# Patient Record
Sex: FEMALE | Race: OTHER | NOT HISPANIC OR LATINO
[De-identification: names, ages, dates, MRNs, and addresses within clinical notes are randomized per-mention and may not be internally consistent; named-entity substitution may affect disease eponyms.]

---

## 2018-08-13 PROBLEM — Z00.00 ENCOUNTER FOR PREVENTIVE HEALTH EXAMINATION: Status: ACTIVE | Noted: 2018-08-13

## 2018-08-17 ENCOUNTER — APPOINTMENT (OUTPATIENT)
Dept: SURGERY | Facility: CLINIC | Age: 38
End: 2018-08-17
Payer: MEDICAID

## 2018-08-17 VITALS
WEIGHT: 203.25 LBS | SYSTOLIC BLOOD PRESSURE: 92 MMHG | HEIGHT: 64.96 IN | OXYGEN SATURATION: 99 % | TEMPERATURE: 97.7 F | BODY MASS INDEX: 33.86 KG/M2 | DIASTOLIC BLOOD PRESSURE: 60 MMHG | HEART RATE: 90 BPM

## 2018-08-17 DIAGNOSIS — Z87.39 PERSONAL HISTORY OF OTHER DISEASES OF THE MUSCULOSKELETAL SYSTEM AND CONNECTIVE TISSUE: ICD-10-CM

## 2018-08-17 PROCEDURE — 99204 OFFICE O/P NEW MOD 45 MIN: CPT

## 2018-08-17 RX ORDER — CLONAZEPAM 2 MG/1
TABLET ORAL
Refills: 0 | Status: ACTIVE | COMMUNITY

## 2018-08-17 RX ORDER — ERGOCALCIFEROL (VITAMIN D2) 1250 MCG
50000 CAPSULE ORAL
Refills: 0 | Status: ACTIVE | COMMUNITY

## 2018-08-17 RX ORDER — IRON/IRON ASP GLY/FA/MV-MIN 38 125-25-1MG
TABLET ORAL
Refills: 0 | Status: ACTIVE | COMMUNITY

## 2018-08-21 ENCOUNTER — APPOINTMENT (OUTPATIENT)
Dept: SURGERY | Facility: CLINIC | Age: 38
End: 2018-08-21

## 2018-08-21 VITALS — WEIGHT: 208.5 LBS | BODY MASS INDEX: 34.74 KG/M2

## 2018-09-10 ENCOUNTER — APPOINTMENT (OUTPATIENT)
Dept: SURGERY | Facility: CLINIC | Age: 38
End: 2018-09-10
Payer: MEDICAID

## 2018-09-10 ENCOUNTER — APPOINTMENT (OUTPATIENT)
Dept: PULMONOLOGY | Facility: CLINIC | Age: 38
End: 2018-09-10
Payer: MEDICAID

## 2018-09-10 VITALS
OXYGEN SATURATION: 98 % | HEART RATE: 79 BPM | WEIGHT: 205 LBS | DIASTOLIC BLOOD PRESSURE: 60 MMHG | BODY MASS INDEX: 35 KG/M2 | RESPIRATION RATE: 12 BRPM | HEIGHT: 64 IN | TEMPERATURE: 98.1 F | SYSTOLIC BLOOD PRESSURE: 90 MMHG

## 2018-09-10 DIAGNOSIS — Z01.811 ENCOUNTER FOR PREPROCEDURAL RESPIRATORY EXAMINATION: ICD-10-CM

## 2018-09-10 DIAGNOSIS — Z78.9 OTHER SPECIFIED HEALTH STATUS: ICD-10-CM

## 2018-09-10 PROCEDURE — 97802 MEDICAL NUTRITION INDIV IN: CPT

## 2018-09-10 PROCEDURE — 99203 OFFICE O/P NEW LOW 30 MIN: CPT

## 2018-09-10 RX ORDER — ALBUTEROL SULFATE 2.5 MG/3ML
(2.5 MG/3ML) SOLUTION RESPIRATORY (INHALATION)
Qty: 2 | Refills: 11 | Status: ACTIVE | COMMUNITY
Start: 2018-09-10

## 2018-09-21 ENCOUNTER — APPOINTMENT (OUTPATIENT)
Dept: PULMONOLOGY | Facility: CLINIC | Age: 38
End: 2018-09-21

## 2018-10-05 ENCOUNTER — APPOINTMENT (OUTPATIENT)
Dept: SLEEP CENTER | Facility: HOME HEALTH | Age: 38
End: 2018-10-05

## 2018-10-05 VITALS — HEIGHT: 64 IN | WEIGHT: 207.13 LBS | BODY MASS INDEX: 35.36 KG/M2

## 2018-10-11 ENCOUNTER — OTHER (OUTPATIENT)
Age: 38
End: 2018-10-11

## 2018-11-09 ENCOUNTER — OUTPATIENT (OUTPATIENT)
Dept: OUTPATIENT SERVICES | Facility: HOSPITAL | Age: 38
LOS: 1 days | End: 2018-11-09
Payer: MEDICAID

## 2018-11-09 ENCOUNTER — APPOINTMENT (OUTPATIENT)
Dept: RADIOLOGY | Facility: HOSPITAL | Age: 38
End: 2018-11-09
Payer: MEDICAID

## 2018-11-09 ENCOUNTER — APPOINTMENT (OUTPATIENT)
Dept: ULTRASOUND IMAGING | Facility: HOSPITAL | Age: 38
End: 2018-11-09
Payer: MEDICAID

## 2018-11-09 PROCEDURE — 76700 US EXAM ABDOM COMPLETE: CPT

## 2018-11-09 PROCEDURE — 74241: CPT | Mod: 26

## 2018-11-09 PROCEDURE — 74241: CPT

## 2018-11-09 PROCEDURE — 76700 US EXAM ABDOM COMPLETE: CPT | Mod: 26

## 2018-11-16 ENCOUNTER — APPOINTMENT (OUTPATIENT)
Dept: SLEEP CENTER | Facility: HOME HEALTH | Age: 38
End: 2018-11-16

## 2018-11-16 ENCOUNTER — APPOINTMENT (OUTPATIENT)
Dept: PULMONOLOGY | Facility: CLINIC | Age: 38
End: 2018-11-16

## 2018-12-06 ENCOUNTER — APPOINTMENT (OUTPATIENT)
Dept: SLEEP CENTER | Facility: HOME HEALTH | Age: 38
End: 2018-12-06
Payer: MEDICAID

## 2018-12-06 ENCOUNTER — OUTPATIENT (OUTPATIENT)
Dept: OUTPATIENT SERVICES | Facility: HOSPITAL | Age: 38
LOS: 1 days | End: 2018-12-06
Payer: MEDICAID

## 2018-12-06 DIAGNOSIS — R06.83 SNORING: ICD-10-CM

## 2018-12-06 PROCEDURE — 95800 SLP STDY UNATTENDED: CPT | Mod: 26

## 2018-12-06 PROCEDURE — 95800 SLP STDY UNATTENDED: CPT

## 2018-12-07 PROBLEM — R06.83 SNORING: Status: ACTIVE | Noted: 2018-09-10

## 2018-12-21 DIAGNOSIS — G47.33 OBSTRUCTIVE SLEEP APNEA (ADULT) (PEDIATRIC): ICD-10-CM

## 2018-12-27 VITALS — HEIGHT: 64 IN | BODY MASS INDEX: 35.36 KG/M2 | WEIGHT: 207.13 LBS

## 2019-01-04 ENCOUNTER — APPOINTMENT (OUTPATIENT)
Dept: PULMONOLOGY | Facility: CLINIC | Age: 39
End: 2019-01-04
Payer: MEDICAID

## 2019-01-04 PROCEDURE — 94729 DIFFUSING CAPACITY: CPT

## 2019-01-04 PROCEDURE — 94060 EVALUATION OF WHEEZING: CPT

## 2019-01-04 PROCEDURE — 94727 GAS DIL/WSHOT DETER LNG VOL: CPT

## 2019-01-29 ENCOUNTER — LABORATORY RESULT (OUTPATIENT)
Age: 39
End: 2019-01-29

## 2019-01-29 ENCOUNTER — APPOINTMENT (OUTPATIENT)
Dept: SURGERY | Facility: CLINIC | Age: 39
End: 2019-01-29
Payer: MEDICAID

## 2019-01-29 VITALS
BODY MASS INDEX: 35.55 KG/M2 | WEIGHT: 208.25 LBS | DIASTOLIC BLOOD PRESSURE: 73 MMHG | HEIGHT: 64 IN | SYSTOLIC BLOOD PRESSURE: 114 MMHG | OXYGEN SATURATION: 100 % | HEART RATE: 90 BPM

## 2019-01-29 PROCEDURE — 99213 OFFICE O/P EST LOW 20 MIN: CPT

## 2019-06-04 ENCOUNTER — APPOINTMENT (OUTPATIENT)
Dept: NEUROLOGY | Facility: CLINIC | Age: 39
End: 2019-06-04
Payer: MEDICAID

## 2019-06-04 VITALS
HEART RATE: 93 BPM | OXYGEN SATURATION: 98 % | WEIGHT: 196 LBS | BODY MASS INDEX: 32.65 KG/M2 | TEMPERATURE: 98.1 F | SYSTOLIC BLOOD PRESSURE: 132 MMHG | HEIGHT: 65 IN | DIASTOLIC BLOOD PRESSURE: 80 MMHG

## 2019-06-04 PROCEDURE — 99204 OFFICE O/P NEW MOD 45 MIN: CPT

## 2019-06-04 RX ORDER — METFORMIN HYDROCHLORIDE 625 MG/1
TABLET ORAL
Refills: 0 | Status: DISCONTINUED | COMMUNITY
End: 2019-06-04

## 2019-06-04 NOTE — HISTORY OF PRESENT ILLNESS
[FreeTextEntry1] : This is a    39   y.o.   female   who presented   for  evaluation of  headaches.   She   states    that she   has  even  having  headaches  for   over   10   years.  She   describes    her   headaches     as    throbbing, associated     with  sensitivity  to light   and   noise.  She    describes    severe   anxiety  associated   with   headaches,   diaphoresis.   She   was    Rx   Sumatriptan   100  mg,  but  states     that  she    can not  tolerate    it    due    to the    "muscle   tightness   and   SOB".     She     was  Rx  Rizatriptan   prior   with      good   response (20  mg   at  once)  She    states     that   headaches   are    about   2-3    times  a    month.  She    states    that  she     was  previously     evaluated  by  Neurologist (10    years    ago ),  when  she     had an  EEG   and   MRI.  She    also   states    that     at times  she  gets     very    anxious,  dizzy     and    confused    and    "not   sure     what is   going  on".   She     states  that   at  times    she    takes     "all    pills    altogether...   for     anxiety   and   migraine".   She     has been  taking  Klonopin    1  mg     TID    prn.   She     has  been     followed    by  Psychiatrist   and  Psychologist.  \par She  reports     a  recent   accident    at  work,  c/o   LBP;  reports   s    h/o   dextroscoliosis, has  an  appointment  with   Ortho.  \par She    has  never  been    taking     any  meds    for     headache    prevention.

## 2019-06-04 NOTE — REVIEW OF SYSTEMS
[Migraine Headache] : migraine headaches [Anxiety] : anxiety [As Noted in HPI] : as noted in HPI [Negative] : Eyes

## 2019-06-04 NOTE — DISCUSSION/SUMMARY
[FreeTextEntry1] : Suspect     migraine     headaches, however    suspect     tension     headaches    as  a   primary   type  of   headaches.\par She     reports      dizziness /   confusion  associated      with   headaches.   She    is     marginal   historian.   The   results of   the    previously   performed    ambulatory   EEG   are   not   available.  I   am      slightly   concerned     about    the   possibility  of     ictal      etiology  of  the     aforementioned   episodes of      diaphoresis/   confusion  /   headache.  However, these     may   be   related     to  tension     headaches  and     anxiety.    She     reports    recent   worsening of     the  severity of  the    headaches.\par \par She     will  need  an  MRI of  the    brain   to  r/o  structural   abnormality\par Ambulatory   EEG\par \par Rizatriptan    prn.  She     was   instructed     to  take     10   mg;  may  be    followed     by the    second     dose    as  needed  in    2   hours;   do    not  exceed     2  tabs  a    day.

## 2019-06-04 NOTE — PHYSICAL EXAM
[General Appearance - Alert] : alert [Oriented To Time, Place, And Person] : oriented to person, place, and time [General Appearance - In No Acute Distress] : in no acute distress [Impaired Insight] : insight and judgment were intact [Place] : oriented to place [Affect] : the affect was normal [Person] : oriented to person [Concentration Intact] : normal concentrating ability [Time] : oriented to time [Repeating Phrases] : no difficulty repeating a phrase [Visual Intact] : visual attention was ~T not ~L decreased [Naming Objects] : no difficulty naming common objects [Fluency] : fluency intact [Writing A Sentence] : no difficulty writing a sentence [Comprehension] : comprehension intact [Past History] : adequate knowledge of personal past history [Cranial Nerves Optic (II)] : visual acuity intact bilaterally,  visual fields full to confrontation, pupils equal round and reactive to light [Reading] : reading intact [Cranial Nerves Facial (VII)] : face symmetrical [Cranial Nerves Trigeminal (V)] : facial sensation intact symmetrically [Cranial Nerves Oculomotor (III)] : extraocular motion intact [Cranial Nerves Vestibulocochlear (VIII)] : hearing was intact bilaterally [Cranial Nerves Glossopharyngeal (IX)] : tongue and palate midline [Motor Tone] : muscle tone was normal in all four extremities [Cranial Nerves Accessory (XI - Cranial And Spinal)] : head turning and shoulder shrug symmetric [Cranial Nerves Hypoglossal (XII)] : there was no tongue deviation with protrusion [Motor Strength] : muscle strength was normal in all four extremities [Abnormal Walk] : normal gait [No Muscle Atrophy] : normal bulk in all four extremities [Sensation Tactile Decrease] : light touch was intact [Balance] : balance was intact [2+] : Ankle jerk left 2+ [Past-pointing] : there was no past-pointing [Tremor] : no tremor present [Plantar Reflex Left Only] : normal on the left [Plantar Reflex Right Only] : normal on the right

## 2019-06-06 ENCOUNTER — OTHER (OUTPATIENT)
Age: 39
End: 2019-06-06

## 2019-06-12 ENCOUNTER — OTHER (OUTPATIENT)
Age: 39
End: 2019-06-12

## 2019-06-17 ENCOUNTER — FORM ENCOUNTER (OUTPATIENT)
Age: 39
End: 2019-06-17

## 2019-06-18 ENCOUNTER — OTHER (OUTPATIENT)
Age: 39
End: 2019-06-18

## 2019-06-18 ENCOUNTER — APPOINTMENT (OUTPATIENT)
Dept: ORTHOPEDIC SURGERY | Facility: CLINIC | Age: 39
End: 2019-06-18
Payer: MEDICAID

## 2019-06-18 ENCOUNTER — OUTPATIENT (OUTPATIENT)
Dept: OUTPATIENT SERVICES | Facility: HOSPITAL | Age: 39
LOS: 1 days | End: 2019-06-18
Payer: MEDICAID

## 2019-06-18 VITALS
WEIGHT: 200 LBS | DIASTOLIC BLOOD PRESSURE: 80 MMHG | SYSTOLIC BLOOD PRESSURE: 125 MMHG | BODY MASS INDEX: 33.32 KG/M2 | OXYGEN SATURATION: 98 % | HEIGHT: 65 IN | HEART RATE: 99 BPM

## 2019-06-18 DIAGNOSIS — G89.29 CERVICALGIA: ICD-10-CM

## 2019-06-18 DIAGNOSIS — M54.2 CERVICALGIA: ICD-10-CM

## 2019-06-18 PROCEDURE — 72082 X-RAY EXAM ENTIRE SPI 2/3 VW: CPT

## 2019-06-18 PROCEDURE — 99204 OFFICE O/P NEW MOD 45 MIN: CPT

## 2019-06-18 PROCEDURE — 72082 X-RAY EXAM ENTIRE SPI 2/3 VW: CPT | Mod: 26

## 2019-06-18 NOTE — REASON FOR VISIT
[Initial Consultation] : an initial consultation for [Back Pain] : back pain [Neck Pain] : neck pain

## 2019-07-05 ENCOUNTER — APPOINTMENT (OUTPATIENT)
Dept: NEUROLOGY | Facility: CLINIC | Age: 39
End: 2019-07-05

## 2019-07-08 NOTE — PHYSICAL EXAM
[de-identified] : XR thoracic 6/18/19: mild multilevel degenerative changes, most pronounced at L4/5, no significant scoliosis, no spondylolisthesis , no acute fractures\par \par XR lumbar 5/2019: mild multilevel degenerative changes, no spondylolisthesis or dynamic instability, mild dextroscoliosis, no acute fractures [de-identified] : Physical Exam:\par \par General: patient is well developed, well nourished, in no acute \par distress, alert and oriented x 3. \par \par Mood and affect: normal\par \par Respiratory: no respiratory distress noted\par \par Skin: no scars over spine, skin intact, no erythema, increased warmth\par \par Alignment:The spine is well compensated in the coronal and sagittal plane. There is no asymmetry on Suresh Forward Bend Test.\par \par Gait: The patient is able to toe walk and heel walk without difficulty. The patient is able to tandem walk without difficulty.\par \par Palpation: no tenderness to palpation midline along spine or paraspinal region\par \par Range of motion: Cervical and Lumbar spine ROM is restricted\par \par Neurologic Exam:\par Motor: Manual Muscle testing in the upper and lower extremities is 5 out of 5 in all muscle groups. There is no evidence of muscular atrophy in the upper extremities. Sensory: Sensation to light touch is grossly intact in the upper and lower extremities\par \par Reflexes: DTR are present and symmetric throughout, negative hewitt bilaterally, negative inverted radial reflex bilaterally, no clonus, plantar responses are flexor\par \par Special tests: Spurlings sign absent. Lhermitte's sign is absent. Straight Leg Raise Negative, Cross Straight Leg Raise Negative, ELBA Test Negative\par \par Hip Exam: Full painless ROM of bilateral hips\par \par Vascular: Examination of the peripheral vascular system demonstrates no evidence of congestion or edema. no lymphedema bilateral lower extremities, pulses are present and symmetric in both lower extremities.\par \par \par
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
## 2019-07-08 NOTE — DISCUSSION/SUMMARY
[de-identified] : Discussed the results of the patient's history, physical exam, and imaging.  Ms. Vaughan has been suffering from nonradiating neck and low back pain for 6 years, onset after an incident at work. Neurologically intact. I am recommending an MRI cervical and lumbar without contrast for further evaluation. Patient will call office once imaging has been completed for review. After imaging has been completed and reviewed, patient to follow up with Dr. Iqbal.  The patient will follow up with me as needed.  All questions were answered.\par \par \par \par

## 2019-07-17 ENCOUNTER — APPOINTMENT (OUTPATIENT)
Dept: NEUROLOGY | Facility: CLINIC | Age: 39
End: 2019-07-17
Payer: MEDICAID

## 2019-07-17 PROCEDURE — 95819 EEG AWAKE AND ASLEEP: CPT

## 2019-07-29 ENCOUNTER — APPOINTMENT (OUTPATIENT)
Dept: INTERNAL MEDICINE | Facility: CLINIC | Age: 39
End: 2019-07-29
Payer: MEDICAID

## 2019-07-29 ENCOUNTER — OTHER (OUTPATIENT)
Age: 39
End: 2019-07-29

## 2019-07-29 VITALS
OXYGEN SATURATION: 100 % | HEIGHT: 65 IN | WEIGHT: 198 LBS | HEART RATE: 91 BPM | DIASTOLIC BLOOD PRESSURE: 65 MMHG | TEMPERATURE: 97 F | BODY MASS INDEX: 32.99 KG/M2 | SYSTOLIC BLOOD PRESSURE: 103 MMHG

## 2019-07-29 DIAGNOSIS — F32.9 MAJOR DEPRESSIVE DISORDER, SINGLE EPISODE, UNSPECIFIED: ICD-10-CM

## 2019-07-29 DIAGNOSIS — R80.9 DIABETES MELLITUS DUE TO UNDERLYING CONDITION WITH OTHER DIABETIC KIDNEY COMPLICATION: ICD-10-CM

## 2019-07-29 DIAGNOSIS — E78.00 PURE HYPERCHOLESTEROLEMIA, UNSPECIFIED: ICD-10-CM

## 2019-07-29 DIAGNOSIS — L29.9 PRURITUS, UNSPECIFIED: ICD-10-CM

## 2019-07-29 DIAGNOSIS — R21 RASH AND OTHER NONSPECIFIC SKIN ERUPTION: ICD-10-CM

## 2019-07-29 DIAGNOSIS — E08.29 DIABETES MELLITUS DUE TO UNDERLYING CONDITION WITH OTHER DIABETIC KIDNEY COMPLICATION: ICD-10-CM

## 2019-07-29 LAB — CYTOLOGY CVX/VAG DOC THIN PREP: NORMAL

## 2019-07-29 PROCEDURE — 99396 PREV VISIT EST AGE 40-64: CPT

## 2019-07-29 PROCEDURE — 99214 OFFICE O/P EST MOD 30 MIN: CPT

## 2019-07-29 PROCEDURE — 36415 COLL VENOUS BLD VENIPUNCTURE: CPT

## 2019-07-29 PROCEDURE — 99386 PREV VISIT NEW AGE 40-64: CPT | Mod: 25,GC

## 2019-07-29 RX ORDER — BUDESONIDE AND FORMOTEROL FUMARATE DIHYDRATE 80; 4.5 UG/1; UG/1
80-4.5 AEROSOL RESPIRATORY (INHALATION) TWICE DAILY
Qty: 1 | Refills: 3 | Status: DISCONTINUED | COMMUNITY
Start: 2018-09-10 | End: 2019-07-29

## 2019-07-29 RX ORDER — ATORVASTATIN CALCIUM 80 MG/1
TABLET, FILM COATED ORAL
Refills: 0 | Status: DISCONTINUED | COMMUNITY
End: 2019-07-29

## 2019-07-31 ENCOUNTER — TRANSCRIPTION ENCOUNTER (OUTPATIENT)
Age: 39
End: 2019-07-31

## 2019-08-04 LAB
ALBUMIN SERPL ELPH-MCNC: 4.2 G/DL
ALP BLD-CCNC: 105 U/L
ALT SERPL-CCNC: 14 U/L
ANION GAP SERPL CALC-SCNC: 13 MMOL/L
APPEARANCE: CLEAR
AST SERPL-CCNC: 15 U/L
BILIRUB SERPL-MCNC: 0.4 MG/DL
BILIRUBIN URINE: NEGATIVE
BLOOD URINE: ABNORMAL
BUN SERPL-MCNC: 7 MG/DL
CALCIUM SERPL-MCNC: 9.9 MG/DL
CHLORIDE SERPL-SCNC: 99 MMOL/L
CHOLEST SERPL-MCNC: 157 MG/DL
CHOLEST/HDLC SERPL: 4 RATIO
CO2 SERPL-SCNC: 22 MMOL/L
COLOR: NORMAL
CREAT SERPL-MCNC: 0.62 MG/DL
ESTIMATED AVERAGE GLUCOSE: 249 MG/DL
GLUCOSE QUALITATIVE U: ABNORMAL
GLUCOSE SERPL-MCNC: 361 MG/DL
HBA1C MFR BLD HPLC: 10.3 %
HDLC SERPL-MCNC: 39 MG/DL
KETONES URINE: NORMAL
LDLC SERPL CALC-MCNC: 80 MG/DL
LEUKOCYTE ESTERASE URINE: NEGATIVE
MICROSCOPIC-UA: NORMAL
NITRITE URINE: NEGATIVE
PH URINE: 5
POTASSIUM SERPL-SCNC: 4.8 MMOL/L
PROT SERPL-MCNC: 7.2 G/DL
PROTEIN URINE: NEGATIVE
RED BLOOD CELLS URINE: 12 /HPF
SODIUM SERPL-SCNC: 134 MMOL/L
SPECIFIC GRAVITY URINE: 1.02
TRIGL SERPL-MCNC: 192 MG/DL
URINE COMMENTS: NORMAL
UROBILINOGEN URINE: NORMAL
WHITE BLOOD CELLS URINE: 5 /HPF

## 2019-08-08 ENCOUNTER — APPOINTMENT (OUTPATIENT)
Dept: OPHTHALMOLOGY | Facility: CLINIC | Age: 39
End: 2019-08-08

## 2019-08-12 ENCOUNTER — APPOINTMENT (OUTPATIENT)
Dept: INTERNAL MEDICINE | Facility: CLINIC | Age: 39
End: 2019-08-12

## 2019-08-13 ENCOUNTER — APPOINTMENT (OUTPATIENT)
Dept: INTERNAL MEDICINE | Facility: CLINIC | Age: 39
End: 2019-08-13

## 2019-08-27 ENCOUNTER — APPOINTMENT (OUTPATIENT)
Dept: INTERNAL MEDICINE | Facility: CLINIC | Age: 39
End: 2019-08-27
Payer: MEDICAID

## 2019-08-27 ENCOUNTER — APPOINTMENT (OUTPATIENT)
Dept: INTERNAL MEDICINE | Facility: CLINIC | Age: 39
End: 2019-08-27

## 2019-08-27 VITALS
OXYGEN SATURATION: 99 % | WEIGHT: 196 LBS | DIASTOLIC BLOOD PRESSURE: 72 MMHG | HEART RATE: 92 BPM | TEMPERATURE: 98.5 F | HEIGHT: 65 IN | SYSTOLIC BLOOD PRESSURE: 106 MMHG | BODY MASS INDEX: 32.65 KG/M2

## 2019-08-27 DIAGNOSIS — R31.29 OTHER MICROSCOPIC HEMATURIA: ICD-10-CM

## 2019-08-27 DIAGNOSIS — L29.9 PRURITUS, UNSPECIFIED: ICD-10-CM

## 2019-08-27 PROCEDURE — 99214 OFFICE O/P EST MOD 30 MIN: CPT | Mod: 25,GC

## 2019-08-27 PROCEDURE — 36415 COLL VENOUS BLD VENIPUNCTURE: CPT

## 2019-08-28 PROBLEM — R31.29 MICROSCOPIC HEMATURIA: Status: ACTIVE | Noted: 2019-08-28

## 2019-08-29 ENCOUNTER — RX RENEWAL (OUTPATIENT)
Age: 39
End: 2019-08-29

## 2019-08-29 ENCOUNTER — OTHER (OUTPATIENT)
Age: 39
End: 2019-08-29

## 2019-08-29 LAB
APPEARANCE: ABNORMAL
BACTERIA: NEGATIVE
BASOPHILS # BLD AUTO: 0.06 K/UL
BASOPHILS NFR BLD AUTO: 0.8 %
BILIRUBIN URINE: NEGATIVE
BLOOD URINE: ABNORMAL
COLOR: NORMAL
EOSINOPHIL # BLD AUTO: 0.42 K/UL
EOSINOPHIL NFR BLD AUTO: 5.3 %
GLUCOSE QUALITATIVE U: ABNORMAL
HCT VFR BLD CALC: 36.9 %
HGB BLD-MCNC: 10.4 G/DL
HYALINE CASTS: 0 /LPF
IMM GRANULOCYTES NFR BLD AUTO: 0.4 %
KETONES URINE: NEGATIVE
LEUKOCYTE ESTERASE URINE: NEGATIVE
LYMPHOCYTES # BLD AUTO: 1.87 K/UL
LYMPHOCYTES NFR BLD AUTO: 23.6 %
MAN DIFF?: NORMAL
MCHC RBC-ENTMCNC: 22.7 PG
MCHC RBC-ENTMCNC: 28.2 GM/DL
MCV RBC AUTO: 80.4 FL
MICROSCOPIC-UA: NORMAL
MONOCYTES # BLD AUTO: 0.38 K/UL
MONOCYTES NFR BLD AUTO: 4.8 %
NEUTROPHILS # BLD AUTO: 5.17 K/UL
NEUTROPHILS NFR BLD AUTO: 65.1 %
NITRITE URINE: NEGATIVE
PH URINE: 5
PLATELET # BLD AUTO: 460 K/UL
PROTEIN URINE: NEGATIVE
RBC # BLD: 4.59 M/UL
RBC # FLD: 15.8 %
RED BLOOD CELLS URINE: 31 /HPF
SPECIFIC GRAVITY URINE: 1.01
SQUAMOUS EPITHELIAL CELLS: 0 /HPF
URIC ACID CRYSTALS: ABNORMAL
URINE COMMENTS: NORMAL
UROBILINOGEN URINE: NORMAL
WBC # FLD AUTO: 7.93 K/UL
WHITE BLOOD CELLS URINE: 0 /HPF

## 2019-08-29 RX ORDER — METFORMIN HYDROCHLORIDE 1000 MG/1
1000 TABLET, COATED ORAL DAILY
Refills: 0 | Status: DISCONTINUED | COMMUNITY
End: 2019-08-29

## 2019-09-04 ENCOUNTER — CLINICAL ADVICE (OUTPATIENT)
Age: 39
End: 2019-09-04

## 2019-09-19 ENCOUNTER — APPOINTMENT (OUTPATIENT)
Dept: DERMATOLOGY | Facility: CLINIC | Age: 39
End: 2019-09-19
Payer: MEDICAID

## 2019-09-19 VITALS — SYSTOLIC BLOOD PRESSURE: 95 MMHG | DIASTOLIC BLOOD PRESSURE: 67 MMHG

## 2019-09-19 DIAGNOSIS — Z87.2 PERSONAL HISTORY OF DISEASES OF THE SKIN AND SUBCUTANEOUS TISSUE: ICD-10-CM

## 2019-09-19 DIAGNOSIS — L30.9 DERMATITIS, UNSPECIFIED: ICD-10-CM

## 2019-09-19 DIAGNOSIS — Z91.89 OTHER SPECIFIED PERSONAL RISK FACTORS, NOT ELSEWHERE CLASSIFIED: ICD-10-CM

## 2019-09-19 PROCEDURE — 99203 OFFICE O/P NEW LOW 30 MIN: CPT

## 2019-09-19 RX ORDER — BETAMETHASONE DIPROPIONATE 0.5 MG/G
0.05 CREAM TOPICAL
Qty: 1 | Refills: 2 | Status: ACTIVE | COMMUNITY
Start: 2019-09-19 | End: 1900-01-01

## 2019-09-23 ENCOUNTER — APPOINTMENT (OUTPATIENT)
Dept: INTERNAL MEDICINE | Facility: CLINIC | Age: 39
End: 2019-09-23
Payer: MEDICAID

## 2019-09-23 VITALS
OXYGEN SATURATION: 100 % | SYSTOLIC BLOOD PRESSURE: 104 MMHG | HEIGHT: 65 IN | DIASTOLIC BLOOD PRESSURE: 72 MMHG | TEMPERATURE: 98.2 F | HEART RATE: 90 BPM | WEIGHT: 196 LBS | BODY MASS INDEX: 32.65 KG/M2

## 2019-09-23 DIAGNOSIS — Z01.818 ENCOUNTER FOR OTHER PREPROCEDURAL EXAMINATION: ICD-10-CM

## 2019-09-23 PROCEDURE — 99213 OFFICE O/P EST LOW 20 MIN: CPT | Mod: GC

## 2019-10-01 ENCOUNTER — APPOINTMENT (OUTPATIENT)
Dept: INTERNAL MEDICINE | Facility: CLINIC | Age: 39
End: 2019-10-01
Payer: MEDICAID

## 2019-10-01 VITALS
HEIGHT: 65 IN | TEMPERATURE: 98.1 F | SYSTOLIC BLOOD PRESSURE: 103 MMHG | HEART RATE: 94 BPM | BODY MASS INDEX: 32.32 KG/M2 | WEIGHT: 194 LBS | DIASTOLIC BLOOD PRESSURE: 73 MMHG | OXYGEN SATURATION: 100 %

## 2019-10-01 DIAGNOSIS — Z23 ENCOUNTER FOR IMMUNIZATION: ICD-10-CM

## 2019-10-01 DIAGNOSIS — Z09 ENCOUNTER FOR FOLLOW-UP EXAMINATION AFTER COMPLETED TREATMENT FOR CONDITIONS OTHER THAN MALIGNANT NEOPLASM: ICD-10-CM

## 2019-10-01 DIAGNOSIS — R31.9 HEMATURIA, UNSPECIFIED: ICD-10-CM

## 2019-10-01 PROCEDURE — 99214 OFFICE O/P EST MOD 30 MIN: CPT | Mod: GC,25

## 2019-10-02 ENCOUNTER — RESULT REVIEW (OUTPATIENT)
Age: 39
End: 2019-10-02

## 2019-10-02 LAB
APPEARANCE: ABNORMAL
BACTERIA: NEGATIVE
BILIRUBIN URINE: NEGATIVE
BLOOD URINE: NEGATIVE
COLOR: NORMAL
GLUCOSE QUALITATIVE U: NEGATIVE
HYALINE CASTS: 0 /LPF
KETONES URINE: NEGATIVE
LEUKOCYTE ESTERASE URINE: ABNORMAL
MICROSCOPIC-UA: NORMAL
NITRITE URINE: NEGATIVE
PH URINE: 5
PROTEIN URINE: NEGATIVE
RED BLOOD CELLS URINE: 2 /HPF
SPECIFIC GRAVITY URINE: 1.01
SQUAMOUS EPITHELIAL CELLS: 2 /HPF
URINE COMMENTS: NORMAL
UROBILINOGEN URINE: NORMAL
WHITE BLOOD CELLS URINE: 8 /HPF

## 2019-10-11 ENCOUNTER — APPOINTMENT (OUTPATIENT)
Dept: ENDOCRINOLOGY | Facility: CLINIC | Age: 39
End: 2019-10-11

## 2019-10-14 ENCOUNTER — TRANSCRIPTION ENCOUNTER (OUTPATIENT)
Age: 39
End: 2019-10-14

## 2019-10-16 ENCOUNTER — RX RENEWAL (OUTPATIENT)
Age: 39
End: 2019-10-16

## 2019-10-16 DIAGNOSIS — K21.9 GASTRO-ESOPHAGEAL REFLUX DISEASE W/OUT ESOPHAGITIS: ICD-10-CM

## 2019-10-16 RX ORDER — GLYBURIDE 5 MG/1
5 TABLET ORAL TWICE DAILY
Qty: 180 | Refills: 0 | Status: DISCONTINUED | COMMUNITY
Start: 2019-07-29 | End: 2019-10-16

## 2019-10-16 RX ORDER — ESCITALOPRAM OXALATE 10 MG/1
10 TABLET ORAL
Qty: 90 | Refills: 0 | Status: ACTIVE | COMMUNITY
Start: 2019-10-16 | End: 1900-01-01

## 2019-10-16 RX ORDER — HALOBETASOL PROPIONATE 0.5 MG/G
0.05 OINTMENT TOPICAL TWICE DAILY
Qty: 1 | Refills: 0 | Status: ACTIVE | COMMUNITY
Start: 2019-10-14 | End: 1900-01-01

## 2019-10-18 ENCOUNTER — OTHER (OUTPATIENT)
Age: 39
End: 2019-10-18

## 2019-10-22 ENCOUNTER — APPOINTMENT (OUTPATIENT)
Dept: INTERNAL MEDICINE | Facility: CLINIC | Age: 39
End: 2019-10-22

## 2019-10-22 ENCOUNTER — MEDICATION RENEWAL (OUTPATIENT)
Age: 39
End: 2019-10-22

## 2019-10-31 ENCOUNTER — MEDICATION RENEWAL (OUTPATIENT)
Age: 39
End: 2019-10-31

## 2019-10-31 RX ORDER — LIDOCAINE 5% 700 MG/1
5 PATCH TOPICAL
Qty: 30 | Refills: 0 | Status: ACTIVE | COMMUNITY
Start: 2019-10-01 | End: 1900-01-01

## 2019-11-01 ENCOUNTER — APPOINTMENT (OUTPATIENT)
Dept: INTERNAL MEDICINE | Facility: CLINIC | Age: 39
End: 2019-11-01

## 2019-11-04 ENCOUNTER — APPOINTMENT (OUTPATIENT)
Dept: INTERNAL MEDICINE | Facility: CLINIC | Age: 39
End: 2019-11-04

## 2019-11-07 ENCOUNTER — APPOINTMENT (OUTPATIENT)
Dept: DERMATOLOGY | Facility: CLINIC | Age: 39
End: 2019-11-07

## 2019-11-12 ENCOUNTER — APPOINTMENT (OUTPATIENT)
Dept: INTERNAL MEDICINE | Facility: CLINIC | Age: 39
End: 2019-11-12
Payer: MEDICAID

## 2019-11-12 VITALS
SYSTOLIC BLOOD PRESSURE: 109 MMHG | OXYGEN SATURATION: 100 % | DIASTOLIC BLOOD PRESSURE: 74 MMHG | TEMPERATURE: 97.8 F | HEIGHT: 65 IN | BODY MASS INDEX: 31.99 KG/M2 | WEIGHT: 192 LBS | HEART RATE: 94 BPM

## 2019-11-12 DIAGNOSIS — J45.32 MILD PERSISTENT ASTHMA WITH STATUS ASTHMATICUS: ICD-10-CM

## 2019-11-12 DIAGNOSIS — R53.83 OTHER FATIGUE: ICD-10-CM

## 2019-11-12 DIAGNOSIS — E11.638 TYPE 2 DIABETES MELLITUS WITH OTHER ORAL COMPLICATIONS: ICD-10-CM

## 2019-11-12 DIAGNOSIS — G89.29 LOW BACK PAIN: ICD-10-CM

## 2019-11-12 DIAGNOSIS — M54.5 LOW BACK PAIN: ICD-10-CM

## 2019-11-12 DIAGNOSIS — D64.9 ANEMIA, UNSPECIFIED: ICD-10-CM

## 2019-11-12 DIAGNOSIS — E11.65 TYPE 2 DIABETES MELLITUS WITH HYPERGLYCEMIA: ICD-10-CM

## 2019-11-12 DIAGNOSIS — E55.9 VITAMIN D DEFICIENCY, UNSPECIFIED: ICD-10-CM

## 2019-11-12 PROCEDURE — 99214 OFFICE O/P EST MOD 30 MIN: CPT | Mod: 25,GC

## 2019-11-12 PROCEDURE — 36415 COLL VENOUS BLD VENIPUNCTURE: CPT

## 2019-11-12 RX ORDER — CYCLOBENZAPRINE HYDROCHLORIDE 5 MG/1
5 TABLET, FILM COATED ORAL
Qty: 10 | Refills: 1 | Status: ACTIVE | COMMUNITY
Start: 2019-11-12 | End: 1900-01-01

## 2019-11-12 RX ORDER — SITAGLIPTIN 100 MG/1
100 TABLET, FILM COATED ORAL DAILY
Qty: 90 | Refills: 0 | Status: ACTIVE | COMMUNITY
Start: 2019-07-29 | End: 1900-01-01

## 2019-11-12 RX ORDER — MONTELUKAST 10 MG/1
10 TABLET, FILM COATED ORAL DAILY
Qty: 90 | Refills: 0 | Status: ACTIVE | COMMUNITY
Start: 2018-09-10 | End: 1900-01-01

## 2019-11-12 RX ORDER — SIMVASTATIN 40 MG/1
40 TABLET, FILM COATED ORAL DAILY
Qty: 90 | Refills: 0 | Status: ACTIVE | COMMUNITY
Start: 2019-07-29 | End: 1900-01-01

## 2019-11-12 RX ORDER — DEXLANSOPRAZOLE 30 MG/1
30 CAPSULE, DELAYED RELEASE ORAL DAILY
Qty: 90 | Refills: 0 | Status: ACTIVE | COMMUNITY
Start: 2019-07-29 | End: 1900-01-01

## 2019-11-12 RX ORDER — RIZATRIPTAN BENZOATE 10 MG/1
10 TABLET ORAL
Qty: 1 | Refills: 0 | Status: ACTIVE | COMMUNITY
Start: 2019-06-04 | End: 1900-01-01

## 2019-11-12 RX ORDER — PANTOPRAZOLE SODIUM 40 MG/1
40 TABLET, DELAYED RELEASE ORAL
Refills: 0 | Status: DISCONTINUED | COMMUNITY
End: 2019-11-12

## 2019-11-12 RX ORDER — ALBUTEROL SULFATE 90 UG/1
108 (90 BASE) AEROSOL, METERED RESPIRATORY (INHALATION)
Qty: 1 | Refills: 3 | Status: ACTIVE | COMMUNITY
Start: 2018-09-10 | End: 1900-01-01

## 2019-11-13 RX ORDER — LEVOTHYROXINE SODIUM 0.14 MG/1
137 TABLET ORAL DAILY
Qty: 30 | Refills: 2 | Status: ACTIVE | COMMUNITY
Start: 1900-01-01 | End: 1900-01-01

## 2019-11-18 LAB
25(OH)D3 SERPL-MCNC: 30.7 NG/ML
ANION GAP SERPL CALC-SCNC: 13 MMOL/L
BASOPHILS # BLD AUTO: 0.06 K/UL
BASOPHILS NFR BLD AUTO: 0.8 %
BUN SERPL-MCNC: 10 MG/DL
CALCIUM SERPL-MCNC: 9.8 MG/DL
CHLORIDE SERPL-SCNC: 102 MMOL/L
CO2 SERPL-SCNC: 20 MMOL/L
CREAT SERPL-MCNC: 0.54 MG/DL
EOSINOPHIL # BLD AUTO: 0.32 K/UL
EOSINOPHIL NFR BLD AUTO: 4 %
ESTIMATED AVERAGE GLUCOSE: 177 MG/DL
GLUCOSE SERPL-MCNC: 234 MG/DL
HBA1C MFR BLD HPLC: 7.8 %
HCT VFR BLD CALC: 35.7 %
HGB BLD-MCNC: 10.2 G/DL
IMM GRANULOCYTES NFR BLD AUTO: 0.4 %
LYMPHOCYTES # BLD AUTO: 1.85 K/UL
LYMPHOCYTES NFR BLD AUTO: 23.3 %
MAN DIFF?: NORMAL
MCHC RBC-ENTMCNC: 21.9 PG
MCHC RBC-ENTMCNC: 28.6 GM/DL
MCV RBC AUTO: 76.8 FL
MONOCYTES # BLD AUTO: 0.49 K/UL
MONOCYTES NFR BLD AUTO: 6.2 %
NEUTROPHILS # BLD AUTO: 5.18 K/UL
NEUTROPHILS NFR BLD AUTO: 65.3 %
PLATELET # BLD AUTO: 455 K/UL
POTASSIUM SERPL-SCNC: 4.6 MMOL/L
RBC # BLD: 4.65 M/UL
RBC # FLD: 15.8 %
SODIUM SERPL-SCNC: 135 MMOL/L
TSH SERPL-ACNC: 0.09 UIU/ML
VIT B12 SERPL-MCNC: 238 PG/ML
WBC # FLD AUTO: 7.93 K/UL

## 2019-11-19 ENCOUNTER — RX CHANGE (OUTPATIENT)
Age: 39
End: 2019-11-19

## 2019-11-19 RX ORDER — METFORMIN HYDROCHLORIDE 1000 MG/1
1000 TABLET, COATED ORAL TWICE DAILY
Qty: 60 | Refills: 2 | Status: ACTIVE | COMMUNITY
Start: 2019-10-22 | End: 1900-01-01

## 2019-11-19 RX ORDER — GLYBURIDE AND METFORMIN HYDROCHLORIDE 5; 500 MG/1; MG/1
5-500 TABLET, FILM COATED ORAL DAILY
Qty: 30 | Refills: 2 | Status: ACTIVE | COMMUNITY
Start: 2019-10-16 | End: 1900-01-01

## 2019-12-03 ENCOUNTER — APPOINTMENT (OUTPATIENT)
Dept: INTERNAL MEDICINE | Facility: CLINIC | Age: 39
End: 2019-12-03
Payer: MEDICAID

## 2019-12-03 ENCOUNTER — APPOINTMENT (OUTPATIENT)
Dept: INTERNAL MEDICINE | Facility: CLINIC | Age: 39
End: 2019-12-03

## 2019-12-03 VITALS
HEIGHT: 65 IN | SYSTOLIC BLOOD PRESSURE: 103 MMHG | BODY MASS INDEX: 32.15 KG/M2 | HEART RATE: 115 BPM | DIASTOLIC BLOOD PRESSURE: 74 MMHG | WEIGHT: 193 LBS | TEMPERATURE: 97.2 F | OXYGEN SATURATION: 96 %

## 2019-12-03 DIAGNOSIS — R41.3 OTHER AMNESIA: ICD-10-CM

## 2019-12-03 DIAGNOSIS — E03.9 HYPOTHYROIDISM, UNSPECIFIED: ICD-10-CM

## 2019-12-03 DIAGNOSIS — Z86.39 PERSONAL HISTORY OF OTHER ENDOCRINE, NUTRITIONAL AND METABOLIC DISEASE: ICD-10-CM

## 2019-12-03 DIAGNOSIS — F32.9 ANXIETY DISORDER, UNSPECIFIED: ICD-10-CM

## 2019-12-03 DIAGNOSIS — R41.0 DISORIENTATION, UNSPECIFIED: ICD-10-CM

## 2019-12-03 DIAGNOSIS — F41.9 ANXIETY DISORDER, UNSPECIFIED: ICD-10-CM

## 2019-12-03 PROCEDURE — 99214 OFFICE O/P EST MOD 30 MIN: CPT | Mod: GC

## 2019-12-13 ENCOUNTER — APPOINTMENT (OUTPATIENT)
Dept: SURGERY | Facility: CLINIC | Age: 39
End: 2019-12-13

## 2019-12-16 ENCOUNTER — APPOINTMENT (OUTPATIENT)
Dept: INTERNAL MEDICINE | Facility: CLINIC | Age: 39
End: 2019-12-16

## 2019-12-27 ENCOUNTER — APPOINTMENT (OUTPATIENT)
Dept: INTERNAL MEDICINE | Facility: CLINIC | Age: 39
End: 2019-12-27

## 2020-01-23 ENCOUNTER — APPOINTMENT (OUTPATIENT)
Dept: NEUROLOGY | Facility: CLINIC | Age: 40
End: 2020-01-23

## 2020-02-11 ENCOUNTER — APPOINTMENT (OUTPATIENT)
Dept: SURGERY | Facility: CLINIC | Age: 40
End: 2020-02-11
Payer: MEDICAID

## 2020-02-11 VITALS
WEIGHT: 196 LBS | SYSTOLIC BLOOD PRESSURE: 107 MMHG | DIASTOLIC BLOOD PRESSURE: 77 MMHG | TEMPERATURE: 98 F | HEART RATE: 94 BPM | HEIGHT: 65 IN | OXYGEN SATURATION: 99 % | BODY MASS INDEX: 32.65 KG/M2

## 2020-02-11 DIAGNOSIS — E66.01 MORBID (SEVERE) OBESITY DUE TO EXCESS CALORIES: ICD-10-CM

## 2020-02-11 PROCEDURE — 99213 OFFICE O/P EST LOW 20 MIN: CPT

## 2020-02-11 NOTE — HISTORY OF PRESENT ILLNESS
[de-identified] : 40 y/o F with hx of HLD, Hypothyroidism, osteoarthritis, T2DM, and obesity (BMI 32), presents here today for follow up. She was last seen here in January 2019. She has not returned here since until today as she was trying to get her A1c levels controlled. She reports that it is about 8 today. She states that she is active with walking daily so far and watching her diet carefully.

## 2020-02-11 NOTE — HISTORY OF PRESENT ILLNESS
[de-identified] : Pt is a 39 y/o F who presents today feeling well. Pt states that she has completed all of her bariatric work-up except for psych evaluation, which she is waiting for an appt. Pt states that she has an appt with PCP next week to manage her diabetes, her current A1C is 11. Pt is aware that is too high for surgery, and needs to be brought down to 8. Pt will follow-up after these appts are complete.

## 2020-02-11 NOTE — END OF VISIT
[FreeTextEntry3] : All medical record entries made by the Scribe were at my, Dr. Forbes's direction and personally dictated by me on 02/11/2020  I have reviewed the chart and agree that the record accurately reflects my personal performance of the history, physical exam, assessment and plan. I have also personally directed, reviewed, and agreed with the chart.\par \par

## 2020-02-11 NOTE — ADDENDUM
[FreeTextEntry1] : Documented by Louisa Macedo acting as a scribe for Dr. Montrell Forbes on 02/11/2020\par

## 2020-03-16 ENCOUNTER — APPOINTMENT (OUTPATIENT)
Dept: NEUROLOGY | Facility: CLINIC | Age: 40
End: 2020-03-16

## 2020-03-18 DIAGNOSIS — R51 HEADACHE: ICD-10-CM

## 2020-03-18 RX ORDER — ELETRIPTAN HYDROBROMIDE 40 MG/1
40 TABLET, FILM COATED ORAL
Qty: 12 | Refills: 3 | Status: ACTIVE | COMMUNITY
Start: 2020-03-18 | End: 1900-01-01
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
## 2022-06-08 ENCOUNTER — NON-APPOINTMENT (OUTPATIENT)
Age: 42
End: 2022-06-08

## 2022-06-10 ENCOUNTER — APPOINTMENT (OUTPATIENT)
Dept: ENDOCRINOLOGY | Facility: CLINIC | Age: 42
End: 2022-06-10

## 2022-06-13 ENCOUNTER — APPOINTMENT (OUTPATIENT)
Dept: NEUROLOGY | Facility: CLINIC | Age: 42
End: 2022-06-13

## 2022-11-09 ENCOUNTER — APPOINTMENT (OUTPATIENT)
Dept: NEUROLOGY | Facility: CLINIC | Age: 42
End: 2022-11-09

## 2023-04-20 ENCOUNTER — APPOINTMENT (OUTPATIENT)
Dept: NEUROLOGY | Facility: CLINIC | Age: 43
End: 2023-04-20

## 2025-02-12 ENCOUNTER — INPATIENT (INPATIENT)
Facility: HOSPITAL | Age: 45
LOS: 0 days | Discharge: HOME | DRG: 603 | End: 2025-02-13
Attending: STUDENT IN AN ORGANIZED HEALTH CARE EDUCATION/TRAINING PROGRAM | Admitting: STUDENT IN AN ORGANIZED HEALTH CARE EDUCATION/TRAINING PROGRAM
Payer: MEDICAID

## 2025-02-12 VITALS
SYSTOLIC BLOOD PRESSURE: 105 MMHG | HEART RATE: 84 BPM | DIASTOLIC BLOOD PRESSURE: 68 MMHG | TEMPERATURE: 98 F | RESPIRATION RATE: 16 BRPM | OXYGEN SATURATION: 99 % | WEIGHT: 179.9 LBS

## 2025-02-12 DIAGNOSIS — D50.9 IRON DEFICIENCY ANEMIA, UNSPECIFIED: ICD-10-CM

## 2025-02-12 DIAGNOSIS — Z29.9 ENCOUNTER FOR PROPHYLACTIC MEASURES, UNSPECIFIED: ICD-10-CM

## 2025-02-12 DIAGNOSIS — E03.9 HYPOTHYROIDISM, UNSPECIFIED: ICD-10-CM

## 2025-02-12 DIAGNOSIS — E78.5 HYPERLIPIDEMIA, UNSPECIFIED: ICD-10-CM

## 2025-02-12 DIAGNOSIS — E11.9 TYPE 2 DIABETES MELLITUS WITHOUT COMPLICATIONS: ICD-10-CM

## 2025-02-12 DIAGNOSIS — F32.9 MAJOR DEPRESSIVE DISORDER, SINGLE EPISODE, UNSPECIFIED: ICD-10-CM

## 2025-02-12 DIAGNOSIS — T14.8XXA OTHER INJURY OF UNSPECIFIED BODY REGION, INITIAL ENCOUNTER: ICD-10-CM

## 2025-02-12 LAB
ADD ON TEST-SPECIMEN IN LAB: SIGNIFICANT CHANGE UP
ANION GAP SERPL CALC-SCNC: 12 MMOL/L — SIGNIFICANT CHANGE UP (ref 5–17)
BASOPHILS # BLD AUTO: 0 K/UL — SIGNIFICANT CHANGE UP (ref 0–0.2)
BASOPHILS NFR BLD AUTO: 0 % — SIGNIFICANT CHANGE UP (ref 0–2)
BUN SERPL-MCNC: 11 MG/DL — SIGNIFICANT CHANGE UP (ref 7–23)
CALCIUM SERPL-MCNC: 9.6 MG/DL — SIGNIFICANT CHANGE UP (ref 8.4–10.5)
CHLORIDE SERPL-SCNC: 91 MMOL/L — LOW (ref 96–108)
CO2 SERPL-SCNC: 23 MMOL/L — SIGNIFICANT CHANGE UP (ref 22–31)
CREAT SERPL-MCNC: 0.67 MG/DL — SIGNIFICANT CHANGE UP (ref 0.5–1.3)
EGFR: 109 ML/MIN/1.73M2 — SIGNIFICANT CHANGE UP
EOSINOPHIL # BLD AUTO: 0.1 K/UL — SIGNIFICANT CHANGE UP (ref 0–0.5)
EOSINOPHIL NFR BLD AUTO: 1.8 % — SIGNIFICANT CHANGE UP (ref 0–6)
GLUCOSE SERPL-MCNC: 666 MG/DL — CRITICAL HIGH (ref 70–99)
GRAM STN FLD: SIGNIFICANT CHANGE UP
HCT VFR BLD CALC: 34.8 % — SIGNIFICANT CHANGE UP (ref 34.5–45)
HGB BLD-MCNC: 10.9 G/DL — LOW (ref 11.5–15.5)
LYMPHOCYTES # BLD AUTO: 1.33 K/UL — SIGNIFICANT CHANGE UP (ref 1–3.3)
LYMPHOCYTES # BLD AUTO: 23.4 % — SIGNIFICANT CHANGE UP (ref 13–44)
MCHC RBC-ENTMCNC: 24.4 PG — LOW (ref 27–34)
MCHC RBC-ENTMCNC: 31.3 G/DL — LOW (ref 32–36)
MCV RBC AUTO: 77.9 FL — LOW (ref 80–100)
MONOCYTES # BLD AUTO: 0.36 K/UL — SIGNIFICANT CHANGE UP (ref 0–0.9)
MONOCYTES NFR BLD AUTO: 6.3 % — SIGNIFICANT CHANGE UP (ref 2–14)
NEUTROPHILS # BLD AUTO: 3.79 K/UL — SIGNIFICANT CHANGE UP (ref 1.8–7.4)
NEUTROPHILS NFR BLD AUTO: 66.7 % — SIGNIFICANT CHANGE UP (ref 43–77)
PLATELET # BLD AUTO: 424 K/UL — HIGH (ref 150–400)
POTASSIUM SERPL-MCNC: 4.2 MMOL/L — SIGNIFICANT CHANGE UP (ref 3.5–5.3)
POTASSIUM SERPL-SCNC: 4.2 MMOL/L — SIGNIFICANT CHANGE UP (ref 3.5–5.3)
RBC # BLD: 4.47 M/UL — SIGNIFICANT CHANGE UP (ref 3.8–5.2)
RBC # FLD: 20.2 % — HIGH (ref 10.3–14.5)
SODIUM SERPL-SCNC: 126 MMOL/L — LOW (ref 135–145)
SPECIMEN SOURCE: SIGNIFICANT CHANGE UP
WBC # BLD: 5.68 K/UL — SIGNIFICANT CHANGE UP (ref 3.8–10.5)
WBC # FLD AUTO: 5.68 K/UL — SIGNIFICANT CHANGE UP (ref 3.8–10.5)

## 2025-02-12 PROCEDURE — 99285 EMERGENCY DEPT VISIT HI MDM: CPT

## 2025-02-12 PROCEDURE — 99223 1ST HOSP IP/OBS HIGH 75: CPT | Mod: GC

## 2025-02-12 PROCEDURE — 73140 X-RAY EXAM OF FINGER(S): CPT | Mod: 26,RT

## 2025-02-12 RX ORDER — SODIUM CHLORIDE 9 G/ML
1000 INJECTION, SOLUTION INTRAVENOUS
Refills: 0 | Status: DISCONTINUED | OUTPATIENT
Start: 2025-02-12 | End: 2025-02-13

## 2025-02-12 RX ORDER — OXYCODONE HYDROCHLORIDE 30 MG/1
2.5 TABLET ORAL ONCE
Refills: 0 | Status: DISCONTINUED | OUTPATIENT
Start: 2025-02-12 | End: 2025-02-12

## 2025-02-12 RX ORDER — ESCITALOPRAM 10 MG/1
10 TABLET, FILM COATED ORAL DAILY
Refills: 0 | Status: DISCONTINUED | OUTPATIENT
Start: 2025-02-12 | End: 2025-02-13

## 2025-02-12 RX ORDER — BACTERIOSTATIC SODIUM CHLORIDE 0.9 %
1000 VIAL (ML) INJECTION ONCE
Refills: 0 | Status: COMPLETED | OUTPATIENT
Start: 2025-02-12 | End: 2025-02-12

## 2025-02-12 RX ORDER — KETOROLAC TROMETHAMINE 10 MG
15 TABLET ORAL ONCE
Refills: 0 | Status: DISCONTINUED | OUTPATIENT
Start: 2025-02-12 | End: 2025-02-12

## 2025-02-12 RX ORDER — ACETAMINOPHEN 160 MG/5ML
650 SUSPENSION ORAL EVERY 6 HOURS
Refills: 0 | Status: DISCONTINUED | OUTPATIENT
Start: 2025-02-12 | End: 2025-02-13

## 2025-02-12 RX ORDER — INSULIN LISPRO 100/ML
3 VIAL (ML) SUBCUTANEOUS
Refills: 0 | Status: DISCONTINUED | OUTPATIENT
Start: 2025-02-12 | End: 2025-02-13

## 2025-02-12 RX ORDER — ATORVASTATIN CALCIUM 80 MG/1
40 TABLET, FILM COATED ORAL AT BEDTIME
Refills: 0 | Status: DISCONTINUED | OUTPATIENT
Start: 2025-02-12 | End: 2025-02-13

## 2025-02-12 RX ORDER — ACETAMINOPHEN, DIPHENHYDRAMINE HCL, PHENYLEPHRINE HCL 325; 25; 5 MG/1; MG/1; MG/1
3 TABLET ORAL AT BEDTIME
Refills: 0 | Status: DISCONTINUED | OUTPATIENT
Start: 2025-02-12 | End: 2025-02-13

## 2025-02-12 RX ORDER — LEVOTHYROXINE SODIUM 25 UG/1
150 TABLET ORAL DAILY
Refills: 0 | Status: DISCONTINUED | OUTPATIENT
Start: 2025-02-12 | End: 2025-02-13

## 2025-02-12 RX ORDER — DM/PSEUDOEPHED/ACETAMINOPHEN 10-30-250
15 CAPSULE ORAL ONCE
Refills: 0 | Status: DISCONTINUED | OUTPATIENT
Start: 2025-02-12 | End: 2025-02-13

## 2025-02-12 RX ORDER — DM/PSEUDOEPHED/ACETAMINOPHEN 10-30-250
25 CAPSULE ORAL ONCE
Refills: 0 | Status: DISCONTINUED | OUTPATIENT
Start: 2025-02-12 | End: 2025-02-13

## 2025-02-12 RX ORDER — AMPICILLIN SODIUM AND SULBACTAM SODIUM 2; 1 G/1; G/1
3 INJECTION, POWDER, FOR SOLUTION INTRAMUSCULAR; INTRAVENOUS EVERY 6 HOURS
Refills: 0 | Status: DISCONTINUED | OUTPATIENT
Start: 2025-02-12 | End: 2025-02-13

## 2025-02-12 RX ORDER — DM/PSEUDOEPHED/ACETAMINOPHEN 10-30-250
12.5 CAPSULE ORAL ONCE
Refills: 0 | Status: DISCONTINUED | OUTPATIENT
Start: 2025-02-12 | End: 2025-02-13

## 2025-02-12 RX ORDER — INSULIN LISPRO 100/ML
VIAL (ML) SUBCUTANEOUS
Refills: 0 | Status: DISCONTINUED | OUTPATIENT
Start: 2025-02-12 | End: 2025-02-13

## 2025-02-12 RX ORDER — GABAPENTIN 800 MG/1
300 TABLET ORAL AT BEDTIME
Refills: 0 | Status: DISCONTINUED | OUTPATIENT
Start: 2025-02-12 | End: 2025-02-13

## 2025-02-12 RX ORDER — INSULIN LISPRO 100/ML
VIAL (ML) SUBCUTANEOUS AT BEDTIME
Refills: 0 | Status: DISCONTINUED | OUTPATIENT
Start: 2025-02-12 | End: 2025-02-13

## 2025-02-12 RX ORDER — AMPICILLIN SODIUM AND SULBACTAM SODIUM 2; 1 G/1; G/1
3 INJECTION, POWDER, FOR SOLUTION INTRAMUSCULAR; INTRAVENOUS ONCE
Refills: 0 | Status: COMPLETED | OUTPATIENT
Start: 2025-02-12 | End: 2025-02-12

## 2025-02-12 RX ORDER — GLUCAGON 3 MG/1
1 POWDER NASAL ONCE
Refills: 0 | Status: DISCONTINUED | OUTPATIENT
Start: 2025-02-12 | End: 2025-02-13

## 2025-02-12 RX ORDER — VANCOMYCIN HYDROCHLORIDE 50 MG/ML
1250 KIT ORAL EVERY 12 HOURS
Refills: 0 | Status: COMPLETED | OUTPATIENT
Start: 2025-02-12 | End: 2025-02-12

## 2025-02-12 RX ORDER — CEFAZOLIN SODIUM IN 0.9 % NACL 2 G/10 ML
1000 SYRINGE (ML) INTRAVENOUS ONCE
Refills: 0 | Status: COMPLETED | OUTPATIENT
Start: 2025-02-12 | End: 2025-02-12

## 2025-02-12 RX ORDER — INSULIN GLARGINE-YFGN 100 [IU]/ML
12 INJECTION, SOLUTION SUBCUTANEOUS AT BEDTIME
Refills: 0 | Status: DISCONTINUED | OUTPATIENT
Start: 2025-02-12 | End: 2025-02-13

## 2025-02-12 RX ADMIN — Medication 1000 MILLILITER(S): at 13:31

## 2025-02-12 RX ADMIN — AMPICILLIN SODIUM AND SULBACTAM SODIUM 200 GRAM(S): 2; 1 INJECTION, POWDER, FOR SOLUTION INTRAMUSCULAR; INTRAVENOUS at 15:23

## 2025-02-12 RX ADMIN — INSULIN GLARGINE-YFGN 12 UNIT(S): 100 INJECTION, SOLUTION SUBCUTANEOUS at 22:45

## 2025-02-12 RX ADMIN — ATORVASTATIN CALCIUM 40 MILLIGRAM(S): 80 TABLET, FILM COATED ORAL at 21:37

## 2025-02-12 RX ADMIN — ESCITALOPRAM 10 MILLIGRAM(S): 10 TABLET, FILM COATED ORAL at 18:49

## 2025-02-12 RX ADMIN — AMPICILLIN SODIUM AND SULBACTAM SODIUM 200 GRAM(S): 2; 1 INJECTION, POWDER, FOR SOLUTION INTRAMUSCULAR; INTRAVENOUS at 20:41

## 2025-02-12 RX ADMIN — Medication 10 UNIT(S): at 13:49

## 2025-02-12 RX ADMIN — Medication 15 MILLIGRAM(S): at 15:09

## 2025-02-12 RX ADMIN — Medication 4: at 22:46

## 2025-02-12 RX ADMIN — Medication 100 MILLIGRAM(S): at 12:42

## 2025-02-12 RX ADMIN — Medication 8: at 18:49

## 2025-02-12 RX ADMIN — VANCOMYCIN HYDROCHLORIDE 166.67 MILLIGRAM(S): KIT at 21:39

## 2025-02-12 RX ADMIN — Medication 1000 MILLILITER(S): at 14:08

## 2025-02-12 NOTE — ED PROVIDER NOTE - PROGRESS NOTE DETAILS
pt seen by Dr. Woodard, small amount of pus expelled, no concern for tenosynovitis.   Glucose elevated, no elevated anion gap. pt only on metformin, given insulin and coming down

## 2025-02-12 NOTE — ED PROVIDER NOTE - ATTENDING APP SHARED VISIT CONTRIBUTION OF CARE
44F with a PMHx of DM2 (not on insulin), hypertension, hyperlipidemia, right-hand-dominant who p/w right index finger swelling since 2/7.  Patient was bit by dog on 2/6 went to urgent care at that time was given Augmentin which she completed.  Patient states since then the finger has been swollen and painful and pus has been coming out. VSS, +erythema, warmth, swelling and fluctuance of 1st proximal phalanx, volar aspect, +open wound. pain with extension. Plan for labs, IV abx, hand consult .  Labs notable for hyperglycemia w/o DKA, pt given IVFs and 10U insulin. Hand performed I&D, will admit to [facility] for infx failed Po abx and further mgmt of hyperglycemia.

## 2025-02-12 NOTE — ED PROVIDER NOTE - PHYSICAL EXAMINATION
CONSTITUTIONAL: Well-appearing;  in no apparent distress.   HEAD: Normocephalic; atraumatic.   EYES: PERRL; EOM intact; conjunctiva and sclera clear  ENT: normal nose; no rhinorrhea; normal pharynx with no erythema or lesions.   NECK: Supple; non-tender;   CARDIOVASCULAR: rrr,  RESPIRATORY: Breathing easily;   MSK: FROM at all extremities, normal tone   EXT: No cyanosis or edema; N/V intact  SKIN: R index finger- +erythema, warmth, swelling and fluctuance of 1st proximal phalanx, volar aspect, +open wound. pain with extension

## 2025-02-12 NOTE — ED ADULT NURSE NOTE - NSFALLUNIVINTERV_ED_ALL_ED
Bed/Stretcher in lowest position, wheels locked, appropriate side rails in place/Call bell, personal items and telephone in reach/Instruct patient to call for assistance before getting out of bed/chair/stretcher/Non-slip footwear applied when patient is off stretcher/[place] to call system/Physically safe environment - no spills, clutter or unnecessary equipment/Purposeful proactive rounding/Room/bathroom lighting operational, light cord in reach

## 2025-02-12 NOTE — ED ADULT TRIAGE NOTE - CHIEF COMPLAINT QUOTE
Patient c/o dog bite on R index finger on 2/6. Was seen at urgent care on 2/6, did not take prescribed ABX. Pt. with some swelling to R index finger. Denies fevers/chills.

## 2025-02-12 NOTE — H&P ADULT - HISTORY OF PRESENT ILLNESS
Ms. Vaughan is a 44 year old female with a past medical history of DMII, Depression, Hypothyroidism, and iron deficiency anemia who presents for evaluation after failing outpatient antibiotics for a dog bite infection. Patient reports that she was accidently bit by her dog on February 6th. She took Augmentin for 5 days that was prescribed by her PCP. She reports that by the time she started antibiotics her hand was swollen and red with increased pain at the finger. She reports that her hand improved drastically, though she still had pain at the finger joint and so was sent in for further evaluation     Patient denies fevers, chills, chest pain, dyspnea, dysuria, increased thirst or urinary frequency. Patient has long standing uncontrolled diabetes with last A1c of 10.1, though has intermittently not taken her medications or followed up due to depression symptoms. Currently reports these symptoms are fairly well controlled.     In the ED patient was Afebrile, HR 84. /68, 99% on room air.   Labs show no leukocytosis, microcytic anemia to 10.9, hyperglycemia to 600s which is improving after insulin administration   Bedside I&D with hand surgery.

## 2025-02-12 NOTE — H&P ADULT - ASSESSMENT
Ms. Vaughan is a 44 year old female with a past medical history of DMII, Depression, Hypothyroidism, and iron deficiency anemia who presents for evaluation after failing outpatient antibiotics for a dog bite infection.

## 2025-02-12 NOTE — H&P ADULT - ATTENDING COMMENTS
501 44 year old female with a past medical history of DMII, Depression, Hypothyroidism, and iron deficiency anemia who presents for evaluation after failing outpatient antibiotics for a dog bite infection.     -c/w vanc/unasyn pending abscess cx   -serial exams   -a1c 13%. Start weight based insulin    -monitor FSG    -mISS    -endocrine consult in am

## 2025-02-12 NOTE — ED ADULT NURSE NOTE - PRIMARY CARE PROVIDER
Anesthesia Evaluation     Patient summary reviewed and Nursing notes reviewed   no history of anesthetic complications:  NPO Solid Status: > 8 hours  NPO Liquid Status: > 8 hours           Airway   Mallampati: II  TM distance: >3 FB  Neck ROM: full  No difficulty expected  Dental    (+) poor dentition    Pulmonary     breath sounds clear to auscultation  (+) a smoker Current, COPD mild,   (-) asthma, shortness of breath, rhonchi, decreased breath sounds, wheezes, pulmonary embolism, no home oxygen  Cardiovascular   Exercise tolerance: good (4-7 METS)    Rhythm: regular  Rate: normal    (+) hypertension,   (-) pacemaker, past MI, angina, murmur, peripheral edema, cardiac stents, DVT      Neuro/Psych  (-) seizures, TIA, CVA  GI/Hepatic/Renal/Endo    (+) obesity,  GERD well controlled,    (-) morbid obesity, no renal disease, diabetes    Musculoskeletal     Abdominal   (+) obese,    Substance History   (+) alcohol use,      OB/GYN negative ob/gyn ROS         Other - negative ROS       (-) arthritis  ROS/Med Hx Other: CT abd/pelvis:  IMPRESSION:  1.  Gallbladder wall thickening with pericholecystic inflammation, most  consistent with acute cholecystitis.  Slightly increased hyperdensity in the  region of the gallbladder neck suggesting cholelithiasis.     2.  Additional findings as described in further detail above.  This result has not been signed. Information might be incomplete    Follows with Dr. Lal PCP    GERD controlled on daily nexium                  Anesthesia Plan    ASA 2 - emergent     general     (Pt last ate last night 6/1/2023)  intravenous induction     Anesthetic plan, risks, benefits, and alternatives have been provided, discussed and informed consent has been obtained with: patient and spouse/significant other.  Pre-procedure education provided  Use of blood products discussed with patient  Consented to blood products.   Plan discussed with CRNA.        CODE STATUS:        unknown

## 2025-02-12 NOTE — ED ADULT NURSE REASSESSMENT NOTE - NS ED NURSE REASSESS COMMENT FT1
x3 attempt to given floor RN report. VSS. Fsx 440. Right 20g patent, no redness or swelling to site. Pt pending transfer to floor. Safety maintained.

## 2025-02-12 NOTE — H&P ADULT - PROBLEM SELECTOR PLAN 4
Microcytic with history of SEA from heavy menses. Ongoing Menses at this time, unchanged from usual.     - Continue Oral iron.   - Monitor for signs of extensive bleeding.

## 2025-02-12 NOTE — PATIENT PROFILE ADULT - FUNCTIONAL ASSESSMENT - DAILY ACTIVITY 1.
Pt step mom given d/c instructions  Pt step mom verbally understood  Pt sent home with step mom. 4 = No assist / stand by assistance

## 2025-02-12 NOTE — ED PROVIDER NOTE - OBJECTIVE STATEMENT
44-year-old female with past medical history of diabetes, hypertension, hyperlipidemia, right-hand-dominant presents with right index finger swelling since 2/7.  Patient was bit by dog on 2/6 went to urgent care at that time was given Augmentin which she completed.  Patient states since then the finger has been swollen and painful and pus has been coming out.  Pain radiates up her arm.  States she had a fever but that resolved.  Went to her doctor today who referred her to the ER to be evaluated by hand specialist.   Denies numbness, tingling.

## 2025-02-12 NOTE — H&P ADULT - PROBLEM SELECTOR PLAN 1
Reported to occur 5 days prior, initial pain and erythema to the dorsum of the hand to the wrist which improved with Augmentin. Patient reports that she had worsening pain to the Right 2nd digit PIP joint. Hand, Dr. Escamilla, saw the patient in the ED with a notable fluid collection and I&D with scant drainage. Culture sent, per Hand sx no concern for tenosynovitis or deeper infection at this time.     - Continue on Unasyn for coverage in light of initial improvement and abscess formation.   - Monitor for drainage.   - Follow up wound culture. Reported to occur 5 days prior, initial pain and erythema to the dorsum of the hand to the wrist which improved with Augmentin. Patient reports that she had worsening pain to the Right 2nd digit PIP joint. Hand, Dr. Escamilla, saw the patient in the ED with a notable fluid collection and I&D with scant drainage. Culture sent, per Hand sx no concern for tenosynovitis or deeper infection at this time.     - Continue on Unasyn for coverage in light of initial improvement and abscess formation.   - Monitor for drainage.   - Follow up wound culture.  -hand recs

## 2025-02-12 NOTE — ED ADULT NURSE NOTE - NSFALLOOBATTEMPT_ED_ALL_ED
MEDICARE WELLNESS VISIT NOTE      HISTORY OF PRESENT ILLNESS:   Curtis Thompson presents for her Subsequent Annual Medicare Wellness Visit. She has no current complaints or concerns. Patient Care Team:  Linn Bullock MD as PCP - General (Internal Medicine)  Rigo Castaneda as Nurse Practitioner (Nurse Practitioner)  Irena Renteria MD (Gastroenterology)        Patient Active Problem List    Diagnosis Date Noted   â¢ Benign essential HTN 2016     Priority: Low   â¢ Hematuria, unspecified 2013     Priority: Low   â¢ Recurrent UTI 2011     Priority: Low   â¢ Osteoporosis 10/10/2011     Priority: Low   â¢ Other and unspecified hyperlipidemia 2011     Priority: Low         Past Medical History:   Diagnosis Date   â¢ Benign essential HTN 2016   â¢ Copy of advanced directive obtained    â¢ HTN (hypertension)     AMBP monitor average 186/95   â¢ Other and unspecified hyperlipidemia    â¢ Other osteoporosis     spine  decrease    â¢ PONV (postoperative nausea and vomiting)          Past Surgical History:   Procedure Laterality Date   â¢ Colonoscopy diagnostic  04    Dr Talya Moffett LLQ pain f/u 10yrs   â¢ Colonoscopy w biopsy  2017    Tubular adenoma polyps, Diverticulosis rectal inflammation with ulceration - may defer from further screeing colonoscopy exms- Dr Erlin Alcantara   â¢ Colonoscopy w biopsy  2018    rectal bx>granulation tissue w/acute inflammation & ulcer exude & fibrin,Dr. Chad Laguna   â¢ Hysterectomy     â¢ Laparoscopy, cholecystectomy      Cholecystectomy, laparoscopic   â¢ Removal gallbladder     â¢ Total abdom hysterectomy      KRYSTA w BSO, for menorrhagia age 46, benign         Social History     Tobacco Use   â¢ Smoking status: Former Smoker     Packs/day: 0.50     Years: 3.00     Pack years: 1.50     Types: Cigarettes     Last attempt to quit: 1956     Years since quittin.0   â¢ Smokeless tobacco: Never Used   Substance Use Topics   â¢ Alcohol use:  No â¢ Drug use: No     Drug use:    Drug Use:    No              Family History   Problem Relation Age of Onset   â¢ Cancer Brother    â¢ Dementia/Alzheimers Sister    â¢ Hypertension Sister    â¢ Diabetes Mother    â¢ Myocardial Infarction Mother 76   â¢ Myocardial Infarction Father 72   â¢ NEGATIVE FAMILY HX OF Other         colon, breast or ovarian tumors       Current Outpatient Medications   Medication Sig Dispense Refill   â¢ Multiple Vitamins-Minerals (CENTRUM SILVER PO) Take 1 tablet by mouth daily. â¢ amLODIPine (NORVASC) 2.5 MG tablet Take 1 tablet by mouth daily. 90 tablet 3   â¢ losartan (COZAAR) 50 MG tablet Take 1 tablet by mouth daily. 90 tablet 3     No current facility-administered medications for this visit. The following items on the Medicare Health Risk Assessment were found to be positive  6 a.) How many servings of Fruits and Vegetables do you have each day ( 1 serving = 1 piece of fruit, 1/2 cup fruits or vegetables):  1 per day     6 b.) How many servings of High Fiber / Whole Grain Foods to you have each day ( 1 serving = 1 cup cold cereal, 1/2 cup cooked cereal, 1 slice bread):  1 per day     8.) During the past 4 weeks, has your physical and emotional health limited your social activities with family, friends, neighbors, or other groups?:  Moderately     11b.) Bowel control problems:  Sometimes         Vision and hearing screens: not performed  Advance Directive document: Yes  The patient has the following Advance Directive documents:  Power of  for [facility] Do Not Resuscitate (DNR)  Cognitive Assessment: no evidence of cognitive dysfunction by direct observation    Recent PHQ 2/9 Score    PHQ 2:  Date PHQ 2 Score   1/18/2019 0       DEPRESSION ASSESSMENT/PLAN:  Depression screening is negative no further plan needed. Body mass index is 22.32 kg/mÂ². BMI ASSESSMENT/PLAN:  Patient BMI is within normal range.      Needed Screening/Treatment:   Immunizations reviewed "and patient needs: Herpes Zoster  Needed follow up:  None    See orders. See Patient Instructions section. No Follow-up on file.   -------------------------------------------------------------------------------------------------------------    NAT ambulatory encounter  INTERNAL MEDICINE OFFICE VISIT    CHIEF COMPLAINT:    Chief Complaint   Patient presents with   â¢ Medicare Wellness Visit         SUBJECTIVE:  Quan Haynes is a 78year old female who presented requesting evaluation for follow up of hypertension and osteoporosis. Hypertension- no lightheadedness, dizziness or headaches    Osteoporosis- declines bisphosphonates. Takes vit d and calcium. 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 No rectal bleeding since recent procedure. PAST HISTORIES:  I have reviewed the patient's medications and allergies, past medical, surgical, social and family history, updating these as appropriate. See Histories section of the electronic medical record for a display of this information. OBJECTIVE:  Physical Exam:    Vital Signs:    Visit Vitals  /64 ([place] [facility] [place]: [place]itting, Cuff Size: Regular)   Pulse 72   Resp 16   Ht 5' 3.5"" (1.613 m)   Wt 58.1 kg   SpO2 97%   BMI 22.32 kg/mÂ²     General: Alert  Cardiovascular:  Regular rate and rhythm, normal S1, S2,  no murmur  Respiratory:  Clear to auscultation bilaterally, no wheeze. Extremities:  No peripheral edema. 2 plus pedal pulses bilaterally. Psych:  Appropriate affect. LAB RESULTS:  Pertinent labs reviewed. ASSESSMENT/PLAN:    1. Hypertension- well controlled, continue losartan 50 mg and amlodipine 2.5 mg daily.    2. Osteoporosis- continue Vit D, Ca and weight bearing exercise    RTC in about 6 months for follow up hypertension or sooner prn          " No

## 2025-02-12 NOTE — ED PROVIDER NOTE - CLINICAL SUMMARY MEDICAL DECISION MAKING FREE TEXT BOX
44-year-old female with past medical history of diabetes, hypertension, hyperlipidemia, right-hand-dominant presents with right index finger swelling since 2/7.  Patient was bit by dog on 2/6 went to urgent care at that time was given Augmentin which she completed.  Patient states since then the finger has been swollen and painful and pus has been coming out.  Pain radiates up her arm.R index finger- +erythema, warmth, swelling and fluctuance of 1st proximal phalanx, volar aspect, +open wound. pain with extension 44-year-old female with past medical history of diabetes, hypertension, hyperlipidemia, right-hand-dominant presents with right index finger swelling since 2/7.  Patient was bit by dog on 2/6 went to urgent care at that time was given Augmentin which she completed.  Patient states since then the finger has been swollen and painful and pus has been coming out.  Pain radiates up her arm.R index finger- +erythema, warmth, swelling and fluctuance of 1st proximal phalanx, volar aspect, +open wound. pain with extension. will check labs, give IV abx, consult hand

## 2025-02-12 NOTE — H&P ADULT - NSICDXPASTMEDICALHX_GEN_ALL_CORE_FT
PAST MEDICAL HISTORY:  Depression, major     Diabetes type 2     Hyperlipidemia     Hypothyroidism     Microcytic anemia

## 2025-02-12 NOTE — ED ADULT NURSE NOTE - OBJECTIVE STATEMENT
Pt received to room E  a&ox4, ambulatory, on room air coming to ED c/o animal bite. Pt history of HTN, HLD, DM type 2. Pt bit by dog on right index finger 2/6. Pt seen by PCP today, c/o finger pain, swelling, and pus. PCP sent pt to ER to be evaluated. Pt respirations even and unlabored, chest rise and fall equal b/l. Pt denies chest pain, HA, SOB, dizziness, N/V/D. #20g placed to LAC, labs collected and sent. Pt pending XR. Stretcher in lowest position, pt safety maintained.

## 2025-02-12 NOTE — H&P ADULT - NSHPLABSRESULTS_GEN_ALL_CORE
.  LABS:                         10.9   5.68  )-----------( 424      ( 12 Feb 2025 12:25 )             34.8     02-12    126[L]  |  91[L]  |  11  ----------------------------<  666[HH]  4.2   |  23  |  0.67    Ca    9.6      12 Feb 2025 12:25    Urinalysis Basic - ( 12 Feb 2025 12:25 )    Color: x / Appearance: x / SG: x / pH: x  Gluc: 666 mg/dL / Ketone: x  / Bili: x / Urobili: x   Blood: x / Protein: x / Nitrite: x   Leuk Esterase: x / RBC: x / WBC x   Sq Epi: x / Non Sq Epi: x / Bacteria: x    RADIOLOGY, EKG & ADDITIONAL TESTS: Reviewed.

## 2025-02-12 NOTE — H&P ADULT - NSHPPHYSICALEXAM_GEN_ALL_CORE
.  VITAL SIGNS:  T(C): 36.7 (02-12-25 @ 15:56), Max: 36.7 (02-12-25 @ 15:56)  T(F): 98.1 (02-12-25 @ 15:56), Max: 98.1 (02-12-25 @ 15:56)  HR: 91 (02-12-25 @ 15:56) (84 - 91)  BP: 114/72 (02-12-25 @ 15:56) (105/68 - 114/72)  BP(mean): --  RR: 16 (02-12-25 @ 15:56) (16 - 16)  SpO2: 99% (02-12-25 @ 15:56) (99% - 99%)  Wt(kg): --    PHYSICAL EXAM:  Constitutional: Patient is in no acute distress, resting comfortably in bed.  HEENT: Atraumatic/normocephalic. mucous membranes moist.   Neck: supple; no lymphadenopathy.   Respiratory: Clear to auscultation bilaterally; no Wheezing/Crackles/Ronchi.   Cardiac: Regular rate and rhythm, S1/S2; no Murmur/Rub/Gallop;   Gastrointestinal: abdomen soft, non-tender and non-distended;   Extremities: Warm and Well perfused, no clubbing or cyanosis; no peripheral edema  Vascular: 2+ radial, Dorsalis pedis and posterior tibial pulses bilaterally.  Dermatologic: skin warm, dry and intact; bilateral acanthosis appearing skin darkening. Right 2nd digit wrapped in Gauze, CDI, post procedurally, no surrounding erythema or pain.   Neurologic: AAOx3;   Psychiatric: affect and characteristics of appearance, verbalizations, behaviors are appropriate

## 2025-02-12 NOTE — H&P ADULT - PROBLEM SELECTOR PLAN 2
Uncontrolled DM, no insulin therapy. A1c of 10 on Januvia 100 mg daily and Metformin 1000 mg BID. Last A1c of 10.1 3 months prior. Patient denies paresthesias though noted to be on gabapentin.     - Sliding scale and accuchecks.   - Consider Endocrine consult if patient willing to stay for recs and possible insulin.   - Patient may be unwilling to stay for recs given dog at home without caretaker. Could consider GLP1 at low dose with outpatient follow up.   - A1c in AM. Uncontrolled DM, no insulin therapy. A1c of 10 on Januvia 100 mg daily and Metformin 1000 mg BID. Last A1c of 10.1 3 months prior. Patient denies paresthesias though noted to be on gabapentin.     - Sliding scale and accuchecks.   - Consider Endocrine consult if patient willing to stay for recs and possible insulin.   - Patient may be unwilling to stay for recs given dog at home without caretaker. Could consider GLP1 at low dose with outpatient follow up.   - A1c in AM.  - Diabetic foot exam when available. Uncontrolled DM, no insulin therapy. A1c of 10 on Januvia 100 mg daily and Metformin 1000 mg BID. Last A1c of 10.1 3 months prior. Patient denies paresthesias though noted to be on gabapentin.     - Sliding scale and accuchecks.   - start weight based insulin. cameron FSG   - endo consult in am   - Patient may be unwilling to stay for recs given dog at home without caretaker. Could consider GLP1 at low dose with outpatient follow up.   - A1c in AM.  - Diabetic foot exam when available.

## 2025-02-13 ENCOUNTER — TRANSCRIPTION ENCOUNTER (OUTPATIENT)
Age: 45
End: 2025-02-13

## 2025-02-13 VITALS
TEMPERATURE: 98 F | RESPIRATION RATE: 18 BRPM | OXYGEN SATURATION: 100 % | DIASTOLIC BLOOD PRESSURE: 74 MMHG | HEART RATE: 72 BPM | SYSTOLIC BLOOD PRESSURE: 115 MMHG

## 2025-02-13 DIAGNOSIS — L30.9 DERMATITIS, UNSPECIFIED: ICD-10-CM

## 2025-02-13 LAB
A1C WITH ESTIMATED AVERAGE GLUCOSE RESULT: 13.9 % — HIGH (ref 4–5.6)
ADD ON TEST-SPECIMEN IN LAB: SIGNIFICANT CHANGE UP
ADD ON TEST-SPECIMEN IN LAB: SIGNIFICANT CHANGE UP
ALBUMIN SERPL ELPH-MCNC: 3.3 G/DL — SIGNIFICANT CHANGE UP (ref 3.3–5)
ALP SERPL-CCNC: 96 U/L — SIGNIFICANT CHANGE UP (ref 40–120)
ALT FLD-CCNC: 10 U/L — SIGNIFICANT CHANGE UP (ref 10–45)
ANION GAP SERPL CALC-SCNC: 9 MMOL/L — SIGNIFICANT CHANGE UP (ref 5–17)
AST SERPL-CCNC: 10 U/L — SIGNIFICANT CHANGE UP (ref 10–40)
BASOPHILS # BLD AUTO: 0 K/UL — SIGNIFICANT CHANGE UP (ref 0–0.2)
BASOPHILS NFR BLD AUTO: 0 % — SIGNIFICANT CHANGE UP (ref 0–2)
BILIRUB SERPL-MCNC: 0.2 MG/DL — SIGNIFICANT CHANGE UP (ref 0.2–1.2)
BUN SERPL-MCNC: 12 MG/DL — SIGNIFICANT CHANGE UP (ref 7–23)
CALCIUM SERPL-MCNC: 8.5 MG/DL — SIGNIFICANT CHANGE UP (ref 8.4–10.5)
CHLORIDE SERPL-SCNC: 102 MMOL/L — SIGNIFICANT CHANGE UP (ref 96–108)
CO2 SERPL-SCNC: 23 MMOL/L — SIGNIFICANT CHANGE UP (ref 22–31)
CREAT SERPL-MCNC: 0.74 MG/DL — SIGNIFICANT CHANGE UP (ref 0.5–1.3)
EGFR: 101 ML/MIN/1.73M2 — SIGNIFICANT CHANGE UP
EOSINOPHIL # BLD AUTO: 0.24 K/UL — SIGNIFICANT CHANGE UP (ref 0–0.5)
EOSINOPHIL NFR BLD AUTO: 4.3 % — SIGNIFICANT CHANGE UP (ref 0–6)
ESTIMATED AVERAGE GLUCOSE: 352 MG/DL — HIGH (ref 68–114)
GLUCOSE SERPL-MCNC: 301 MG/DL — HIGH (ref 70–99)
HCT VFR BLD CALC: 30 % — LOW (ref 34.5–45)
HGB BLD-MCNC: 9.5 G/DL — LOW (ref 11.5–15.5)
LYMPHOCYTES # BLD AUTO: 1.75 K/UL — SIGNIFICANT CHANGE UP (ref 1–3.3)
LYMPHOCYTES # BLD AUTO: 31 % — SIGNIFICANT CHANGE UP (ref 13–44)
MCHC RBC-ENTMCNC: 25.4 PG — LOW (ref 27–34)
MCHC RBC-ENTMCNC: 31.7 G/DL — LOW (ref 32–36)
MCV RBC AUTO: 80.2 FL — SIGNIFICANT CHANGE UP (ref 80–100)
MONOCYTES # BLD AUTO: 0.2 K/UL — SIGNIFICANT CHANGE UP (ref 0–0.9)
MONOCYTES NFR BLD AUTO: 3.5 % — SIGNIFICANT CHANGE UP (ref 2–14)
NEUTROPHILS # BLD AUTO: 3.45 K/UL — SIGNIFICANT CHANGE UP (ref 1.8–7.4)
NEUTROPHILS NFR BLD AUTO: 61.2 % — SIGNIFICANT CHANGE UP (ref 43–77)
PLATELET # BLD AUTO: 347 K/UL — SIGNIFICANT CHANGE UP (ref 150–400)
POTASSIUM SERPL-MCNC: 4.3 MMOL/L — SIGNIFICANT CHANGE UP (ref 3.5–5.3)
POTASSIUM SERPL-SCNC: 4.3 MMOL/L — SIGNIFICANT CHANGE UP (ref 3.5–5.3)
PROT SERPL-MCNC: 5.7 G/DL — LOW (ref 6–8.3)
RBC # BLD: 3.74 M/UL — LOW (ref 3.8–5.2)
RBC # FLD: 20.2 % — HIGH (ref 10.3–14.5)
SODIUM SERPL-SCNC: 134 MMOL/L — LOW (ref 135–145)
WBC # BLD: 5.63 K/UL — SIGNIFICANT CHANGE UP (ref 3.8–10.5)
WBC # FLD AUTO: 5.63 K/UL — SIGNIFICANT CHANGE UP (ref 3.8–10.5)

## 2025-02-13 PROCEDURE — 96372 THER/PROPH/DIAG INJ SC/IM: CPT

## 2025-02-13 PROCEDURE — 36415 COLL VENOUS BLD VENIPUNCTURE: CPT

## 2025-02-13 PROCEDURE — 73140 X-RAY EXAM OF FINGER(S): CPT

## 2025-02-13 PROCEDURE — 87205 SMEAR GRAM STAIN: CPT

## 2025-02-13 PROCEDURE — 87070 CULTURE OTHR SPECIMN AEROBIC: CPT

## 2025-02-13 PROCEDURE — 83036 HEMOGLOBIN GLYCOSYLATED A1C: CPT

## 2025-02-13 PROCEDURE — 84439 ASSAY OF FREE THYROXINE: CPT

## 2025-02-13 PROCEDURE — 80048 BASIC METABOLIC PNL TOTAL CA: CPT

## 2025-02-13 PROCEDURE — 99239 HOSP IP/OBS DSCHRG MGMT >30: CPT

## 2025-02-13 PROCEDURE — 99222 1ST HOSP IP/OBS MODERATE 55: CPT

## 2025-02-13 PROCEDURE — 80053 COMPREHEN METABOLIC PANEL: CPT

## 2025-02-13 PROCEDURE — 83735 ASSAY OF MAGNESIUM: CPT

## 2025-02-13 PROCEDURE — 96375 TX/PRO/DX INJ NEW DRUG ADDON: CPT

## 2025-02-13 PROCEDURE — 96374 THER/PROPH/DIAG INJ IV PUSH: CPT

## 2025-02-13 PROCEDURE — 82962 GLUCOSE BLOOD TEST: CPT

## 2025-02-13 PROCEDURE — 99285 EMERGENCY DEPT VISIT HI MDM: CPT

## 2025-02-13 PROCEDURE — 85025 COMPLETE CBC W/AUTO DIFF WBC: CPT

## 2025-02-13 PROCEDURE — 84100 ASSAY OF PHOSPHORUS: CPT

## 2025-02-13 PROCEDURE — 87077 CULTURE AEROBIC IDENTIFY: CPT

## 2025-02-13 PROCEDURE — 84443 ASSAY THYROID STIM HORMONE: CPT

## 2025-02-13 RX ORDER — ISOPROPYL ALCOHOL, BENZOCAINE .7; .06 ML/ML; ML/ML
0 SWAB TOPICAL
Qty: 100 | Refills: 1
Start: 2025-02-13

## 2025-02-13 RX ORDER — SULFAMETHOXAZOLE AND TRIMETHOPRIM 400; 80 MG/1; MG/1
1 TABLET ORAL
Qty: 16 | Refills: 0
Start: 2025-02-13 | End: 2025-02-20

## 2025-02-13 RX ORDER — TIRZEPATIDE 12.5 MG/.5ML
2.5 INJECTION, SOLUTION SUBCUTANEOUS
Qty: 4 | Refills: 0
Start: 2025-02-13

## 2025-02-13 RX ORDER — AMOXICILLIN AND CLAVULANATE POTASSIUM 200; 28.5 MG/5ML; MG/5ML
875 POWDER, FOR SUSPENSION ORAL
Qty: 16 | Refills: 0
Start: 2025-02-13 | End: 2025-02-20

## 2025-02-13 RX ORDER — GLIMEPIRIDE 4 MG/1
1 TABLET ORAL
Qty: 30 | Refills: 0
Start: 2025-02-13 | End: 2025-03-14

## 2025-02-13 RX ORDER — METFORMIN HYDROCHLORIDE 1000 MG/1
1 TABLET, COATED ORAL
Qty: 60 | Refills: 0
Start: 2025-02-13 | End: 2025-03-14

## 2025-02-13 RX ORDER — SITAGLIPTIN 25 MG/1
1 TABLET ORAL
Refills: 0 | DISCHARGE

## 2025-02-13 RX ORDER — ACETAMINOPHEN 160 MG/5ML
975 SUSPENSION ORAL ONCE
Refills: 0 | Status: COMPLETED | OUTPATIENT
Start: 2025-02-13 | End: 2025-02-13

## 2025-02-13 RX ORDER — SITAGLIPTIN 25 MG/1
1 TABLET ORAL
Qty: 30 | Refills: 0
Start: 2025-02-13 | End: 2025-03-14

## 2025-02-13 RX ORDER — INSULIN LISPRO 100/ML
10 VIAL (ML) SUBCUTANEOUS
Refills: 0 | Status: DISCONTINUED | OUTPATIENT
Start: 2025-02-13 | End: 2025-02-13

## 2025-02-13 RX ORDER — METFORMIN HYDROCHLORIDE 1000 MG/1
2 TABLET, COATED ORAL
Refills: 0 | DISCHARGE

## 2025-02-13 RX ORDER — INSULIN LISPRO 100/ML
7 VIAL (ML) SUBCUTANEOUS
Refills: 0 | Status: DISCONTINUED | OUTPATIENT
Start: 2025-02-13 | End: 2025-02-13

## 2025-02-13 RX ORDER — INSULIN GLARGINE-YFGN 100 [IU]/ML
30 INJECTION, SOLUTION SUBCUTANEOUS AT BEDTIME
Refills: 0 | Status: DISCONTINUED | OUTPATIENT
Start: 2025-02-13 | End: 2025-02-13

## 2025-02-13 RX ORDER — INSULIN NPH HUMAN ISOPHANE 100/ML (3)
15 INSULIN PEN (ML) SUBCUTANEOUS ONCE
Refills: 0 | Status: COMPLETED | OUTPATIENT
Start: 2025-02-13 | End: 2025-02-13

## 2025-02-13 RX ADMIN — Medication 8: at 13:32

## 2025-02-13 RX ADMIN — Medication 3 UNIT(S): at 09:27

## 2025-02-13 RX ADMIN — AMPICILLIN SODIUM AND SULBACTAM SODIUM 200 GRAM(S): 2; 1 INJECTION, POWDER, FOR SOLUTION INTRAMUSCULAR; INTRAVENOUS at 09:28

## 2025-02-13 RX ADMIN — AMPICILLIN SODIUM AND SULBACTAM SODIUM 200 GRAM(S): 2; 1 INJECTION, POWDER, FOR SOLUTION INTRAMUSCULAR; INTRAVENOUS at 14:18

## 2025-02-13 RX ADMIN — Medication 15 UNIT(S): at 13:52

## 2025-02-13 RX ADMIN — LEVOTHYROXINE SODIUM 150 MICROGRAM(S): 25 TABLET ORAL at 05:45

## 2025-02-13 RX ADMIN — AMPICILLIN SODIUM AND SULBACTAM SODIUM 200 GRAM(S): 2; 1 INJECTION, POWDER, FOR SOLUTION INTRAMUSCULAR; INTRAVENOUS at 02:36

## 2025-02-13 RX ADMIN — Medication 7 UNIT(S): at 13:32

## 2025-02-13 RX ADMIN — Medication 8: at 09:27

## 2025-02-13 NOTE — DISCHARGE NOTE NURSING/CASE MANAGEMENT/SOCIAL WORK - NSDCFUADDAPPT_GEN_ALL_CORE_FT
In regards to her diabetes management, the patient can follow up at discharge with [facility] by calling [phone] to make an appointment.

## 2025-02-13 NOTE — CONSULT NOTE ADULT - PROBLEM SELECTOR RECOMMENDATION 2
· 	Synthroid 150 mcg (0.15 mg) oral tablet: Last Dose Taken:  , 1 tab(s) orally once a day Diagnosed 10 years ago.   Dose recently changed from "blue" pill to "gray" pill due to elevated TSH. Assuming 137 mcg increased to 150mcg daily. Reports taking appropriately and not missing any doses.  2/13/2025: TSH 23 and free T4 0.964    Continue levothyroxine 150 mcg daily. Repeat TFTs as OP.

## 2025-02-13 NOTE — CONSULT NOTE ADULT - NSCONSULTADDITIONALINFOA_GEN_ALL_CORE
MDM- moderate  1. problems assessed  moderate- 1 undiagnosed new problem with uncertain prognosis  2. data reviewed and analyzed  3 external notes from unique sources  3. risks of complications  moderate

## 2025-02-13 NOTE — CONSULT NOTE ADULT - PROBLEM SELECTOR RECOMMENDATION 9
Type 2 diabetes mellitus with hyperglycemia  - Please continue lantus *** units at bedtime.   - Continue lispro *** units before each meal.  - Continue lispro moderate / low dose sliding scale before meals and at bedtime.  - Patient's fingerstick glucose goal is 100-180 mg/dL.    - For discharge, patient can ***.    - Patient can follow up at discharge with [facility] by calling [phone] to make an appointment.      Case discussed with Dr. Wallace. Primary team updated. Type 2 diabetes mellitus with hyperglycemia  - Please give lantus 30 units at bedtime.   - Continue lispro 10 units before each meal.  - Continue lispro moderate dose sliding scale before meals and at bedtime.  - Patient's fingerstick glucose goal is 100-180 mg/dL.    - For discharge: Continue Metformin 1000mg twice daily and Januvia 100mg daily. Start glimepiride 2mg daily. Pt declines insulin or injectable. She was amenable to glucose monitoring with glucometer and education provided. Declined CGM though it was covered.  - Patient can follow up at discharge with [facility] by calling [phone] to make an appointment.      Case discussed with Dr. Wallace. Primary team updated.

## 2025-02-13 NOTE — DISCHARGE NOTE PROVIDER - NSDCFUADDAPPT_GEN_ALL_CORE_FT
In regards to her diabetes management, the patient can follow up at discharge with [facility] by calling [phone] to make an appointment.

## 2025-02-13 NOTE — PROGRESS NOTE ADULT - SUBJECTIVE AND OBJECTIVE BOX
CC: Patient is a 44y old  Female who presents with a chief complaint of cellulitis (12 Feb 2025 17:25)      INTERVAL EVENTS: KYLIE    SUBJECTIVE / INTERVAL HPI: Patient seen and examined at bedside.     ROS: negative unless otherwise stated above.    VITAL SIGNS:  Vital Signs Last 24 Hrs  T(C): 36.4 (13 Feb 2025 05:27), Max: 36.8 (12 Feb 2025 21:09)  T(F): 97.6 (13 Feb 2025 05:27), Max: 98.3 (12 Feb 2025 21:09)  HR: 60 (13 Feb 2025 05:27) (60 - 91)  BP: 100/60 (13 Feb 2025 05:27) (100/60 - 114/72)  BP(mean): --  RR: 17 (13 Feb 2025 05:27) (16 - 18)  SpO2: 98% (13 Feb 2025 05:27) (98% - 100%)    Parameters below as of 13 Feb 2025 05:27  Patient On (Oxygen Delivery Method): room air            PHYSICAL EXAM:    General: NAD  HEENT: MMM  Neck: supple  Cardiovascular: +S1/S2; RRR  Respiratory: CTA B/L; no W/R/R  Gastrointestinal: soft, NT/ND  Extremities: WWP; no edema, clubbing or cyanosis  Vascular: 2+ radial, DP/PT pulses B/L  Neurological: AAOx3; no focal deficits    MEDICATIONS:  MEDICATIONS  (STANDING):  ampicillin/sulbactam  IVPB 3 Gram(s) IV Intermittent every 6 hours  atorvastatin 40 milliGRAM(s) Oral at bedtime  dextrose 5%. 1000 milliLiter(s) (100 mL/Hr) IV Continuous <Continuous>  dextrose 5%. 1000 milliLiter(s) (50 mL/Hr) IV Continuous <Continuous>  dextrose 50% Injectable 25 Gram(s) IV Push once  dextrose 50% Injectable 12.5 Gram(s) IV Push once  dextrose 50% Injectable 25 Gram(s) IV Push once  escitalopram 10 milliGRAM(s) Oral daily  gabapentin 300 milliGRAM(s) Oral at bedtime  glucagon  Injectable 1 milliGRAM(s) IntraMuscular once  insulin glargine Injectable (LANTUS) 12 Unit(s) SubCutaneous at bedtime  insulin lispro (ADMELOG) corrective regimen sliding scale   SubCutaneous three times a day before meals  insulin lispro (ADMELOG) corrective regimen sliding scale   SubCutaneous at bedtime  insulin lispro Injectable (ADMELOG) 3 Unit(s) SubCutaneous three times a day before meals  levothyroxine 150 MICROGram(s) Oral daily    MEDICATIONS  (PRN):  acetaminophen     Tablet .. 650 milliGRAM(s) Oral every 6 hours PRN Temp greater or equal to 38C (100.4F), Mild Pain (1 - 3)  dextrose Oral Gel 15 Gram(s) Oral once PRN Blood Glucose LESS THAN 70 milliGRAM(s)/deciliter  melatonin 3 milliGRAM(s) Oral at bedtime PRN Insomnia      ALLERGIES:  Allergies    No Known Allergies    Intolerances        LABS:                        9.5    5.63  )-----------( 347      ( 13 Feb 2025 05:30 )             30.0     02-13    134[L]  |  102  |  12  ----------------------------<  301[H]  4.3   |  23  |  0.74    Ca    8.5      13 Feb 2025 05:30    TPro  5.7[L]  /  Alb  3.3  /  TBili  0.2  /  DBili  x   /  AST  10  /  ALT  10  /  AlkPhos  96  02-13      Urinalysis Basic - ( 13 Feb 2025 05:30 )    Color: x / Appearance: x / SG: x / pH: x  Gluc: 301 mg/dL / Ketone: x  / Bili: x / Urobili: x   Blood: x / Protein: x / Nitrite: x   Leuk Esterase: x / RBC: x / WBC x   Sq Epi: x / Non Sq Epi: x / Bacteria: x      CAPILLARY BLOOD GLUCOSE      POCT Blood Glucose.: 311 mg/dL (13 Feb 2025 08:55)      RADIOLOGY & ADDITIONAL TESTS: Reviewed. CC: Patient is a 44y old  Female who presents with a chief complaint of cellulitis (12 Feb 2025 17:25)      INTERVAL EVENTS: o/n: ordered oxycodone 2.5 x1 for extreme pain, not wanting tylenol, d8idn't want oxy either ; 12units lantus, 3x premeal lispro.     SUBJECTIVE / INTERVAL HPI: Patient seen and examined at bedside. Patient is feeling alright. Has a 7/10 sharp pain in her right index finger. She had BM 2 days prior, is urinating ok, and has good appetite.     ROS: negative unless otherwise stated above.    VITAL SIGNS:  Vital Signs Last 24 Hrs  T(C): 36.4 (13 Feb 2025 05:27), Max: 36.8 (12 Feb 2025 21:09)  T(F): 97.6 (13 Feb 2025 05:27), Max: 98.3 (12 Feb 2025 21:09)  HR: 60 (13 Feb 2025 05:27) (60 - 91)  BP: 100/60 (13 Feb 2025 05:27) (100/60 - 114/72)  BP(mean): --  RR: 17 (13 Feb 2025 05:27) (16 - 18)  SpO2: 98% (13 Feb 2025 05:27) (98% - 100%)    Parameters below as of 13 Feb 2025 05:27  Patient On (Oxygen Delivery Method): room air            PHYSICAL EXAM:    General: NAD  HEENT: MMM  Neck: supple  Cardiovascular: +S1/S2; RRR  Respiratory: CTA B/L; no W/R/R  Gastrointestinal: soft, NT/ND  Extremities: WWP; no edema, clubbing or cyanosis. Right index finger has erythema but no swelling between the PIP and MCP joint of the finger. Finger is TTP  Vascular: 2+ radial, DP/PT pulses B/L  Neurological: AAOx3; no focal deficits    MEDICATIONS:  MEDICATIONS  (STANDING):  ampicillin/sulbactam  IVPB 3 Gram(s) IV Intermittent every 6 hours  atorvastatin 40 milliGRAM(s) Oral at bedtime  dextrose 5%. 1000 milliLiter(s) (100 mL/Hr) IV Continuous <Continuous>  dextrose 5%. 1000 milliLiter(s) (50 mL/Hr) IV Continuous <Continuous>  dextrose 50% Injectable 25 Gram(s) IV Push once  dextrose 50% Injectable 12.5 Gram(s) IV Push once  dextrose 50% Injectable 25 Gram(s) IV Push once  escitalopram 10 milliGRAM(s) Oral daily  gabapentin 300 milliGRAM(s) Oral at bedtime  glucagon  Injectable 1 milliGRAM(s) IntraMuscular once  insulin glargine Injectable (LANTUS) 12 Unit(s) SubCutaneous at bedtime  insulin lispro (ADMELOG) corrective regimen sliding scale   SubCutaneous three times a day before meals  insulin lispro (ADMELOG) corrective regimen sliding scale   SubCutaneous at bedtime  insulin lispro Injectable (ADMELOG) 3 Unit(s) SubCutaneous three times a day before meals  levothyroxine 150 MICROGram(s) Oral daily    MEDICATIONS  (PRN):  acetaminophen     Tablet .. 650 milliGRAM(s) Oral every 6 hours PRN Temp greater or equal to 38C (100.4F), Mild Pain (1 - 3)  dextrose Oral Gel 15 Gram(s) Oral once PRN Blood Glucose LESS THAN 70 milliGRAM(s)/deciliter  melatonin 3 milliGRAM(s) Oral at bedtime PRN Insomnia      ALLERGIES:  Allergies    No Known Allergies    Intolerances        LABS:                        9.5    5.63  )-----------( 347      ( 13 Feb 2025 05:30 )             30.0     02-13    134[L]  |  102  |  12  ----------------------------<  301[H]  4.3   |  23  |  0.74    Ca    8.5      13 Feb 2025 05:30    TPro  5.7[L]  /  Alb  3.3  /  TBili  0.2  /  DBili  x   /  AST  10  /  ALT  10  /  AlkPhos  96  02-13      Urinalysis Basic - ( 13 Feb 2025 05:30 )    Color: x / Appearance: x / SG: x / pH: x  Gluc: 301 mg/dL / Ketone: x  / Bili: x / Urobili: x   Blood: x / Protein: x / Nitrite: x   Leuk Esterase: x / RBC: x / WBC x   Sq Epi: x / Non Sq Epi: x / Bacteria: x      CAPILLARY BLOOD GLUCOSE      POCT Blood Glucose.: 311 mg/dL (13 Feb 2025 08:55)      RADIOLOGY & ADDITIONAL TESTS: Reviewed.

## 2025-02-13 NOTE — DISCHARGE NOTE PROVIDER - ATTENDING DISCHARGE PHYSICAL EXAMINATION:
Constitutional: Patient is in no acute distress, resting comfortably in bed.  HEENT: Atraumatic/normocephalic. mucous membranes moist.   Neck: supple; no lymphadenopathy.   Respiratory: Clear to auscultation bilaterally; no Wheezing/Crackles/Ronchi.   Cardiac: Regular rate and rhythm, S1/S2; no Murmur/Rub/Gallop;   Gastrointestinal: abdomen soft, non-tender and non-distended;   Extremities: Warm and Well perfused, no clubbing or cyanosis; no peripheral edema  Vascular: 2+ radial, Dorsalis pedis and posterior tibial pulses bilaterally.  Dermatologic: skin warm, dry and intact; bilateral acanthosis appearing skin darkening.   Right 2nd digit w/o swelling, erythema, no purulence. Able to bend digit.   Neurologic: AAOx3;

## 2025-02-13 NOTE — CONSULT NOTE ADULT - TIME BILLING
complexity
Bedside exam and interview.  Reviewed labs and glucose.  Insulin adjustment.  Education (Diet, medication, diabetes devices).  Discussed plan of care with primary team.  Documentation of encounter.

## 2025-02-13 NOTE — CONSULT NOTE ADULT - ASSESSMENT
IMP-  dog bite cellulitis, improving    REC-  continue oral antibiotics  10% betadine soaks 2-3 times a day, then DSD  Pt should express during soaks, consider NSAID 30 minutes prior to expressing

## 2025-02-13 NOTE — CONSULT NOTE ADULT - SUBJECTIVE AND OBJECTIVE BOX
HISTORY OF PRESENT ILLNESS  Ms. Vaughan is a 44 year old female with a past medical history of DMII, Depression, Hypothyroidism, and iron deficiency anemia who presents for evaluation after failing outpatient antibiotics for a dog bite infection. Patient reports that she was accidently bit by her dog on February 6th. She took Augmentin for 5 days that was prescribed by her PCP. She reports that by the time she started antibiotics her hand was swollen and red with increased pain at the finger. She reports that her hand improved drastically, though she still had pain at the finger joint and so was sent in for further evaluation     Patient denies fevers, chills, chest pain, dyspnea, dysuria, increased thirst or urinary frequency. Patient has long standing uncontrolled diabetes with last A1c of 10.1, though has intermittently not taken her medications or followed up due to depression symptoms. Currently reports these symptoms are fairly well controlled.     In the ED patient was Afebrile, HR 84. /68, 99% on room air.   Labs show no leukocytosis, microcytic anemia to 10.9, hyperglycemia to 600s which is improving after insulin administration   Bedside I&D with hand surgery.     · 	Synthroid 150 mcg (0.15 mg) oral tablet: Last Dose Taken:  , 1 tab(s) orally once a day  · 	gabapentin 300 mg oral tablet: Last Dose Taken:  , orally once a day (at bedtime)  · 	MetFORMIN (Eqv-Fortamet) 500 mg oral tablet, extended release: 2 tab(s) orally once a day  · 	Lipitor 40 mg oral tablet: Last Dose Taken:  , 1 tab(s) orally once a day  · 	Lexapro 10 mg oral tablet: Last Dose Taken:  , 1 tab(s) orally once a day  · 	Januvia 100 mg oral tablet: Last Dose Taken:  , 1 tab(s) orally once a day    Endocrine is consulted for diabetes management (chronic, exacerbated).    CAPILLARY BLOOD GLUCOSE & INSULIN RECEIVED  666 mg/dL (02-12 @ 12:25)  552 mg/dL (02-12 @ 13:44)  498 mg/dL (02-12 @ 15:00)  440 mg/dL (02-12 @ 15:54)  385 mg/dL (02-12 @ 16:52)  322 mg/dL (02-12 @ 18:48)  315 mg/dL (02-12 @ 22:03)  301 mg/dL (02-13 @ 05:30)  311 mg/dL (02-13 @ 08:55)      DIABETES HISTORY  - Age at diagnosis:   - Symptoms at time of diagnosis:   - Current Therapy:  - History of other regimens:   - History of hypoglycemia:   - History of DKA/HHS:   - Complications:   - Home FSG:        > Fasting: *** mg/dL.        > Before meals: *** mg/dL.        > Bedtime: *** mg/dL.  - Diet:          > Breakfast:         > Lunch:        > Dinner:        > Snacks:  - Physical activity:    - Outpatient follow-up:     PAST MEDICAL & SURGICAL HISTORY  As per history of present illness.     FAMILY HISTORY  - Diabetes:  - Thyroid:  - Autoimmune:  - Other:    SOCIAL HISTORY  - Work:  - Alcohol:  - Smoking:  - Recreational Drugs:    ALLERGIES  No Known Allergies    CURRENT MEDICATIONS  acetaminophen     Tablet .. 650 milliGRAM(s) Oral every 6 hours PRN  ampicillin/sulbactam  IVPB 3 Gram(s) IV Intermittent every 6 hours  atorvastatin 40 milliGRAM(s) Oral at bedtime  dextrose 5%. 1000 milliLiter(s) IV Continuous <Continuous>  dextrose 5%. 1000 milliLiter(s) IV Continuous <Continuous>  dextrose 50% Injectable 25 Gram(s) IV Push once  dextrose 50% Injectable 12.5 Gram(s) IV Push once  dextrose 50% Injectable 25 Gram(s) IV Push once  dextrose Oral Gel 15 Gram(s) Oral once PRN  escitalopram 10 milliGRAM(s) Oral daily  gabapentin 300 milliGRAM(s) Oral at bedtime  glucagon  Injectable 1 milliGRAM(s) IntraMuscular once  insulin glargine Injectable (LANTUS) 12 Unit(s) SubCutaneous at bedtime  insulin lispro (ADMELOG) corrective regimen sliding scale   SubCutaneous three times a day before meals  insulin lispro (ADMELOG) corrective regimen sliding scale   SubCutaneous at bedtime  insulin lispro Injectable (ADMELOG) 7 Unit(s) SubCutaneous three times a day before meals  insulin NPH human recombinant 15 Unit(s) SubCutaneous once  levothyroxine 150 MICROGram(s) Oral daily  melatonin 3 milliGRAM(s) Oral at bedtime PRN    REVIEW OF SYSTEMS  Constitutional:  Negative fever, chills or loss of appetite.  Eyes:  Negative blurry vision or double vision.  Cardiovascular:  Negative for chest pain or palpitations.  Respiratory:  Negative for cough, wheezing, or shortness of breath.   Gastrointestinal:  Negative for nausea, vomiting, diarrhea, constipation, or abdominal pain.  Genitourinary:  Negative frequency, urgency or dysuria.  Neurologic:  No headache, confusion, dizziness, lightheadedness.    PHYSICAL EXAM  Vital Signs Last 24 Hrs  T(C): 36.4 (13 Feb 2025 05:27), Max: 36.8 (12 Feb 2025 21:09)  T(F): 97.6 (13 Feb 2025 05:27), Max: 98.3 (12 Feb 2025 21:09)  HR: 60 (13 Feb 2025 05:27) (60 - 91)  BP: 100/60 (13 Feb 2025 05:27) (100/60 - 114/72)  BP(mean): --  RR: 17 (13 Feb 2025 05:27) (16 - 18)  SpO2: 98% (13 Feb 2025 05:27) (98% - 100%)    Parameters below as of 13 Feb 2025 05:27  Patient On (Oxygen Delivery Method): room air    Constitutional: Awake, alert, in no acute distress.   HEENT: Normocephalic, atraumatic, MARTELL, no proptosis or lid retraction.   Neck: supple, no acanthosis, no thyromegaly or palpable thyroid nodules.  Respiratory: Lungs clear to ausculation bilaterally.   Cardiovascular: regular rhythm, normal S1 and S2, no audible murmurs.   GI: soft, non-tender, non-distended, bowel sounds present, no masses appreciated.  Extremities: No lower extremity edema, peripheral pulses present.   Skin: no rashes.   Psychiatric: AAO x 3. Normal affect/mood.     LABS  CBC - WBC/HGB/HTC/PLT: 5.63/9.5/30.0/347 (02-13-25)  BMP: Na/K/Cl/Bicarb/BUN/Cr/Gluc: 134/4.3/102/23/12/0.74/301 (02-13-25)  Anion Gap: 9 (02-13-25)  eGFR: 101 (02-13-25)  Calcium: 8.5 (02-13-25)  Phosphorus: 4.2 (02-13-25)  Magnesium: 1.8 (02-13-25)  LFT - Alb/Tprot/Tbili/Dbili/AlkPhos/ALT/AST: 3.3/--/0.2/--/96/10/10 (02-13-25)     HISTORY OF PRESENT ILLNESS  Ms. Vaughan is a 44 year old female with a past medical history of T2DM, Depression (on lexapro), Hypothyroidism, and iron deficiency anemia who presents for evaluation after failing outpatient antibiotics for a dog bite infection. Patient reports that she was accidently bit by her dog on February 6th. She took Augmentin for 5 days that was prescribed by her PCP. She reports that by the time she started antibiotics her hand was swollen and red with increased pain at the finger. She reports that her hand improved drastically, though she still had pain at the finger joint and so was sent in for further evaluation     In the ED patient was Afebrile, HR 84. /68, 99% on room air.   Labs show no leukocytosis, microcytic anemia to 10.9, hyperglycemia to 600s which is improving after insulin administration   Bedside I&D with hand surgery.     Endocrine is consulted for diabetes management (chronic, exacerbated).  Patient has long standing uncontrolled diabetes with last A1c of 10.1, though has intermittently not taken her medications or followed up due to depression symptoms. Currently reports these symptoms are fairly well controlled. Patient is very fearful of self-injections and has no family members willing to assist.     CAPILLARY BLOOD GLUCOSE & INSULIN RECEIVED  666 mg/dL (02-12 @ 12:25) - Regular 10 SC  552 mg/dL (02-12 @ 13:44)  498 mg/dL (02-12 @ 15:00)  440 mg/dL (02-12 @ 15:54)  385 mg/dL (02-12 @ 16:52)  322 mg/dL (02-12 @ 18:48) - Lispro 8. Ate past and cauliflower.  315 mg/dL (02-12 @ 22:03) - Lantus 12 + lispro 4  301 mg/dL (02-13 @ 05:30)  311 mg/dL (02-13 @ 08:55) - Lispro 3+8. Ate oatmeal and home fries.  341 mg/dL (02-13 @ lunch) - NPH 15 and lispro 7+8      DIABETES HISTORY  - Age at diagnosis: approx 10 years ago  - Symptoms at time of diagnosis: Lab work   - Current Therapy: Metformin TID, unsure dose and Januvia 100mg daily  - History of other regimens:  None  - History of hypoglycemia: None  - History of DKA/HHS: Denies  - Complications: + Retinopathy.  - Home FSG: Doesn't monitor; fearful of needles.  - Diet:          > Breakfast: Tea, milk and splenda        > Lunch: Skip        > Dinner: 1 naan and large portion of coates with potatoes        > Snacks: Apple/day, sometime banana or watermelon. No juice, soda, or sweets.  - Physical activity:  Long walk twice daily with dog.  - Outpatient follow-up: PCP who has referred her to diabetes specialities but she refuses to go.    Hypothyroidism:  Diagnosed 10 years ago.   Dose recently changed from "blue" pill to "gray" pill due to elevated TSH. Assuming 137 mcg increased to 150mcg daily. Reports taking appropriately and not missing any doses.  2/13/2025: TSH 23 and free T4 0.964    PAST MEDICAL & SURGICAL HISTORY  As per history of present illness.     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 Lives alone. Has daughter that lives in area; also has needle phobia.  - Alcohol: Denies  - Smoking: Denies  - Recreational Drugs: Denies    ALLERGIES  No Known Allergies    CURRENT MEDICATIONS  acetaminophen     Tablet .. 650 milliGRAM(s) Oral every 6 hours PRN  ampicillin/sulbactam  IVPB 3 Gram(s) IV Intermittent every 6 hours  atorvastatin 40 milliGRAM(s) Oral at bedtime  dextrose 5%. 1000 milliLiter(s) IV Continuous <Continuous>  dextrose 5%. 1000 milliLiter(s) IV Continuous <Continuous>  dextrose 50% Injectable 25 Gram(s) IV Push once  dextrose 50% Injectable 12.5 Gram(s) IV Push once  dextrose 50% Injectable 25 Gram(s) IV Push once  dextrose Oral Gel 15 Gram(s) Oral once PRN  escitalopram 10 milliGRAM(s) Oral daily  gabapentin 300 milliGRAM(s) Oral at bedtime  glucagon  Injectable 1 milliGRAM(s) IntraMuscular once  insulin glargine Injectable (LANTUS) 12 Unit(s) SubCutaneous at bedtime  insulin lispro (ADMELOG) corrective regimen sliding scale   SubCutaneous three times a day before meals  insulin lispro (ADMELOG) corrective regimen sliding scale   SubCutaneous at bedtime  insulin lispro Injectable (ADMELOG) 7 Unit(s) SubCutaneous three times a day before meals  insulin NPH human recombinant 15 Unit(s) SubCutaneous once  levothyroxine 150 MICROGram(s) Oral daily  melatonin 3 milliGRAM(s) Oral at bedtime PRN    REVIEW OF SYSTEMS  Constitutional:  Negative fever, chills or loss of appetite.  Eyes:  Negative blurry vision or double vision.  Cardiovascular:  Negative for chest pain or palpitations.  Respiratory:  Negative for cough, wheezing, or shortness of breath.   Gastrointestinal:  Negative for nausea, vomiting, diarrhea, constipation, or abdominal pain.  Genitourinary:  Negative frequency, urgency or dysuria.  Neurologic:  No headache, confusion, dizziness, lightheadedness.    PHYSICAL EXAM  Vital Signs Last 24 Hrs  T(C): 36.4 (13 Feb 2025 05:27), Max: 36.8 (12 Feb 2025 21:09)  T(F): 97.6 (13 Feb 2025 05:27), Max: 98.3 (12 Feb 2025 21:09)  HR: 60 (13 Feb 2025 05:27) (60 - 91)  BP: 100/60 (13 Feb 2025 05:27) (100/60 - 114/72)  BP(mean): --  RR: 17 (13 Feb 2025 05:27) (16 - 18)  SpO2: 98% (13 Feb 2025 05:27) (98% - 100%)    Parameters below as of 13 Feb 2025 05:27  Patient On (Oxygen Delivery Method): room air    Constitutional: Awake, alert, in no acute distress.   HEENT: Normocephalic, atraumatic, MARTELL, no proptosis or lid retraction.   Neck: supple, no acanthosis, no thyromegaly or palpable thyroid nodules.  Respiratory: Lungs clear to ausculation bilaterally.   Cardiovascular: regular rhythm, normal S1 and S2, no audible murmurs.   GI: soft, non-tender, non-distended, bowel sounds present, no masses appreciated.  Extremities: No lower extremity edema, peripheral pulses present.   Skin: no rashes.   Psychiatric: AAO x 3. Normal affect/mood.     LABS  CBC - WBC/HGB/HTC/PLT: 5.63/9.5/30.0/347 (02-13-25)  BMP: Na/K/Cl/Bicarb/BUN/Cr/Gluc: 134/4.3/102/23/12/0.74/301 (02-13-25)  Anion Gap: 9 (02-13-25)  eGFR: 101 (02-13-25)  Calcium: 8.5 (02-13-25)  Phosphorus: 4.2 (02-13-25)  Magnesium: 1.8 (02-13-25)  LFT - Alb/Tprot/Tbili/Dbili/AlkPhos/ALT/AST: 3.3/--/0.2/--/96/10/10 (02-13-25)

## 2025-02-13 NOTE — PROGRESS NOTE ADULT - PROBLEM SELECTOR PLAN 7
Eczema on the b/l shins and lateral legs , dorsal forearms, upper trapezius  -derm outpatient follow up

## 2025-02-13 NOTE — DISCHARGE NOTE PROVIDER - NSDCMRMEDTOKEN_GEN_ALL_CORE_FT
alcohol swabs: Apply topically to affected area 4 times a day  amoxicillin-clavulanate 875 mg-125 mg oral tablet: 875 milligram(s) orally 2 times a day  Bactrim  mg-160 mg oral tablet: 1 tab(s) orally 2 times a day  Dexcom G7 [place]: Use as directed  Dexcom G7 Sensors: Change every 10 days  gabapentin 300 mg oral tablet: orally once a day (at bedtime)  glucometer (per patient&#x27;s insurance): Test blood sugars four times a day. Dispense #1 glucometer.  Januvia 100 mg oral tablet: 1 tab(s) orally once a day  lancets: 1 application subcutaneously 4 times a day  Lexapro 10 mg oral tablet: 1 tab(s) orally once a day  Lipitor 40 mg oral tablet: 1 tab(s) orally once a day  MetFORMIN (Eqv-Fortamet) 500 mg oral tablet, extended release: 2 tab(s) orally once a day  Mounjaro 2.5 mg/0.5 mL subcutaneous solution: 2.5 milligram(s) subcutaneously once a week  Synthroid 150 mcg (0.15 mg) oral tablet: 1 tab(s) orally once a day  test strips (per patient&#x27;s insurance): 1 application subcutaneously 4 times a day. ** Compatible with patient&#x27;s glucometer **   amoxicillin-clavulanate 875 mg-125 mg oral tablet: 875 milligram(s) orally 2 times a day  Bactrim  mg-160 mg oral tablet: 1 tab(s) orally 2 times a day  Dexcom G7 [place]: Use as directed  Dexcom G7 Sensors: Change every 10 days  gabapentin 300 mg oral tablet: orally once a day (at bedtime)  glimepiride 2 mg oral tablet: 1 tab(s) orally once a day Please take before breakfast  glucometer (per patient&#x27;s insurance): Test blood sugars four times a day. Dispense #1 glucometer.  Januvia 100 mg oral tablet: 1 tab(s) orally once a day  lancets: 1 application subcutaneously 4 times a day  Lexapro 10 mg oral tablet: 1 tab(s) orally once a day  Lipitor 40 mg oral tablet: 1 tab(s) orally once a day  MetFORMIN (Eqv-Fortamet) 1000 mg oral tablet, extended release: 1 tab(s) orally 2 times a day  Synthroid 150 mcg (0.15 mg) oral tablet: 1 tab(s) orally once a day  test strips (per patient&#x27;s insurance): 1 application subcutaneously 4 times a day. ** Compatible with patient&#x27;s glucometer **

## 2025-02-13 NOTE — CONSULT NOTE ADULT - SUBJECTIVE AND OBJECTIVE BOX
43 yo female who was bitten by dog on 2/6/25  Pt went to urgent care where Augmentin given  Pt noted swelling 2/7/25 which improved with antibiotics  pt c/o pain, swelling, redness RT II  Pt underwent I&D by Dr Woodard    ED, IM, nursingnotes reviewed  case d/w IM team    ROS- reviewed and noted  PSH- reviewed and noted  PMH- reviewed and noted  MEDS- reviewed and noted  ALL- reviewed and noted    PE- system specific  RT II  open wound over proximal phalanx, 2 mm  serous drainage minimal  mild volar edema  mild erythema  moderate tenderness    XRAY- films reviewed, report reviewed  negative

## 2025-02-13 NOTE — PROGRESS NOTE ADULT - PROBLEM SELECTOR PLAN 1
Reported to occur 5 days prior, initial pain and erythema to the dorsum of the hand to the wrist which improved with Augmentin. Patient reports that she had worsening pain to the Right 2nd digit PIP joint. Hand, Dr. Escamilla, saw the patient in the ED with a notable fluid collection and I&D with scant drainage. Culture sent, per Hand sx no concern for tenosynovitis or deeper infection at this time.   s/p ID by hand plastics   2/13: no wound drainage    - Unasyn 3g q6 hrs   - Discharge on Bactrim and Augmentin (8 days total)  - Monitor for drainage.   - Follow up wound culture.  -hand plastic reccs

## 2025-02-13 NOTE — DISCHARGE NOTE PROVIDER - HOSPITAL COURSE
#Discharge: do not delete  Ms. Vaughan is a 44 year old female with a past medical history of DMII, Depression, Hypothyroidism, and iron deficiency anemia who presented with redness, erythema and an abscess on the right index finger from a dog bite, requiring I&D, antibiotics, and wound care, found to have uncontrolled hyperglycemia from type 2 diabetes.     Hospital course (by problem):     #Infected Dog Bite Wound    On February 6th, the patient sustained a dog bite on her right index finger. She initially sought care at an urgent care facility, where she was started on Augmentin. Despite this treatment, she experienced worsening pain and redness, prompting a visit to the emergency department. In the ED, the hand plastic surgery team performed an incision and drainage procedure due to a notable fluid collection. The patient was subsequently started on Unasyn. She will be discharged with a prescription for Bactrim and Augmentin, to be taken for a total of 8 days.    PLAN:  Antibiotics as follows: Bactrim DS (800 mg-160 mg):Take one tablet by mouth twice a day.  Amoxicillin-Clavulanate (875 mg-125 mg): Take one tablet by mouth twice a day.    Wound care as follows: 10% betadine soaks 2-3 times a day, then DSD  Pt should express during soaks, consider NSAID 30 minutes prior to expressing    #Uncontrolled diabetes   Uncontrolled DM, no insulin therapy. A1c of 10 on Januvia 100 mg daily and Metformin 1000 mg BID. Last A1c of 10.1 3 months prior. Patient denies paresthesias though noted to be on gabapentin. New A1c of 13.9    Endocrine consulted and recommend_____________________________    Patient was discharged to: Unasyn ,bactrim  New medications: unasyn  Changes to old medications:  Medications that were stopped:    Items to follow up as outpatient: In regards to her diabetes management, the patient can follow up at discharge with [facility] by calling [phone] to make an appointment.  In regards to your eczema please make a dermatology appointment at  [facility] at  [address] [place] [address]  Phone: [phone]    Physical exam at the time of discharge:  Constitutional: Patient is in no acute distress, resting comfortably in bed.  HEENT: Atraumatic/normocephalic. mucous membranes moist.   Neck: supple; no lymphadenopathy.   Respiratory: Clear to auscultation bilaterally; no Wheezing/Crackles/Ronchi.   Cardiac: Regular rate and rhythm, S1/S2; no Murmur/Rub/Gallop;   Gastrointestinal: abdomen soft, non-tender and non-distended;   Extremities: Warm and Well perfused, no clubbing or cyanosis; no peripheral edema  Vascular: 2+ radial, Dorsalis pedis and posterior tibial pulses bilaterally.  Dermatologic: skin warm, dry and intact; bilateral acanthosis appearing skin darkening. Right 2nd digit wrapped in Gauze, CDI, post procedurally, no surrounding erythema or pain.   Neurologic: AAOx3;   Psychiatric: affect and characteristics of appearance, verbalizations, behaviors are appropriate #Discharge: do not delete  Ms. Vaughan is a 44 year old female with a past medical history of DMII, Depression, Hypothyroidism, and iron deficiency anemia who presented with redness, erythema and an abscess on the right index finger from a dog bite, requiring I&D, antibiotics, and wound care, found to have uncontrolled hyperglycemia from type 2 diabetes.     Hospital course (by problem):     #Infected Dog Bite Wound    On February 6th, the patient sustained a dog bite on her right index finger. She initially sought care at an urgent care facility, where she was started on Augmentin. Despite this treatment, she experienced worsening pain and redness, prompting a visit to the emergency department. In the ED, the hand plastic surgery team performed an incision and drainage procedure due to a notable fluid collection. The patient was subsequently started on Unasyn. She will be discharged with a prescription for Bactrim and Augmentin, to be taken for a total of 8 days.    PLAN:  Antibiotics for a total of 10 day course of treatment as follows: Bactrim DS (800 mg-160 mg):Take one tablet by mouth twice a day.  Amoxicillin-Clavulanate (875 mg-125 mg): Take one tablet by mouth twice a day.    Wound care as follows: 10% betadine soaks 2-3 times a day, then DSD  Pt should express during soaks, consider NSAID 30 minutes prior to expressing    #Uncontrolled diabetes   Uncontrolled DM, no insulin therapy. A1c of 10 on Januvia 100 mg daily and Metformin 1000 mg BID. Last A1c of 10.1 3 months prior. Patient denies paresthesias though noted to be on gabapentin. New A1c of 13.9    Endocrine consulted and recommend Metformin 1000mg BID, Januvia 100mg daily, and Glimepiride 2mg before breakfast     Patient was discharged to: Home   New medications: Augmentin, Bactrim, Metformin 1000mg BID, Januvia 100mg daily, and Glimepiride 2mg before breakfast       Items to follow up as outpatient: In regards to her diabetes management, the patient can follow up at discharge with [facility] by calling [phone] to make an appointment.  In regards to your eczema please make a dermatology appointment at  [facility] at  [address] [place] [id number]  Phone: [phone]    Physical exam at the time of discharge:  Constitutional: Patient is in no acute distress, resting comfortably in bed.  HEENT: Atraumatic/normocephalic. mucous membranes moist.   Neck: supple; no lymphadenopathy.   Respiratory: Clear to auscultation bilaterally; no Wheezing/Crackles/Ronchi.   Cardiac: Regular rate and rhythm, S1/S2; no Murmur/Rub/Gallop;   Gastrointestinal: abdomen soft, non-tender and non-distended;   Extremities: Warm and Well perfused, no clubbing or cyanosis; no peripheral edema  Vascular: 2+ radial, Dorsalis pedis and posterior tibial pulses bilaterally.  Dermatologic: skin warm, dry and intact; bilateral acanthosis appearing skin darkening. Right 2nd digit wrapped in Gauze, CDI, post procedurally, no surrounding erythema or pain.   Neurologic: AAOx3;   Psychiatric: affect and characteristics of appearance, verbalizations, behaviors are appropriate

## 2025-02-13 NOTE — CONSULT NOTE ADULT - ASSESSMENT
Ms. Vaughan is a 44 year old female with a past medical history of DMII, Depression, Hypothyroidism, and iron deficiency anemia who presents for evaluation after failing outpatient antibiotics for a dog bite infection s/p bedside I&D.    Endocrine is consulted for diabetes management (new, chronic, stable, exacerbated).    24 hour glucose data reviewed.  3 labs reviewed - normal/abnormal  Insulin adjustment    A1C: 13.9 %  BUN: 12  Creatinine: 0.74  GFR: 101  Weight: 81.6  BMI:   EF:    Ms. Vaughan is a 44 year old female with a past medical history of DMII, Depression, Hypothyroidism, and iron deficiency anemia who presents for evaluation after failing outpatient antibiotics for a dog bite infection s/p bedside I&D.    Endocrine is consulted for type 2 diabetes management (chronic, exacerbated).    24 hour glucose data reviewed.  3 labs reviewed   Insulin adjustment    A1C: 13.9 % - above target. Reports A1C 10%  BUN: 12  Creatinine: 0.74 - normal  GFR: 101  Weight: 81.6, Height reported as 5 ft 6in  BMI: 29 - overweight.

## 2025-02-13 NOTE — DISCHARGE NOTE PROVIDER - NSDCCPCAREPLAN_GEN_ALL_CORE_FT
PRINCIPAL DISCHARGE DIAGNOSIS  Diagnosis: Infection of wound due to dog bite  Assessment and Plan of Treatment: A dog bite wound infection occurs when bacteria from a dog's mouth enter your skin through the bite. Our skin acts as a protective barrier, but when it's broken, germs can get inside and cause an infection. The area around the bite may become red and swollen.  The bite might hurt more than it initially did, and it could feel tender to the touch. The skin around the bite may feel warm. You might see pus or other fluid coming from the wound. You could develop a fever as your body fights the infection.  It's important to clean the wound thoroughly and seek medical attention if you notice any signs of infection. Your doctor HAS prescribeD antibiotics to help clear up the infection and prevent it from spreading.  PLAN:  Antibiotics as follows:   -Bactrim DS (800 mg-160 mg):Take one tablet by mouth twice a day.  -Amoxicillin-Clavulanate (875 mg-125 mg): Take one tablet by mouth twice a day.  Wound Care Instructions:  1. Betadine Soaks: Soak the wound in a 10% Betadine solution 2-3 times a day.       2. Expression:During the Betadine soaks, gently express the wound to help with drainage.  3. Pain Management: Consider taking an NSAID (e.g., ibuprofen) 30 minutes before expressing the wound to help manage pain.  4. Dressing: After each soak and expression, apply a dry sterile dressing  to the wound.      SECONDARY DISCHARGE DIAGNOSES  Diagnosis: Hyperglycemia  Assessment and Plan of Treatment: Uncontrolled diabetes means that the sugar levels in your blood are not being managed well. When you have diabetes, your body has trouble using sugar from the food you eat as energy. If your blood sugar stays too high for too long, it can cause problems like feeling tired, thirsty, or needing to urinate often. Over time, it can also lead to more serious issues with your eyes, kidneys, nerves, and heart. Managing diabetes usually involves a combination of healthy eating, regular exercise, checking your blood sugar levels, and taking medications if prescribed. It's important to keep your diabetes under control to stay healthy and prevent complications.  PLAN  Please follow your new diabetes management plan:  In regards to your diabetes management, the you can follow up at discharge with [facility] by calling [phone] to make an appointment.     PRINCIPAL DISCHARGE DIAGNOSIS  Diagnosis: Infection of wound due to dog bite  Assessment and Plan of Treatment: A dog bite wound infection occurs when bacteria from a dog's mouth enter your skin through the bite. Our skin acts as a protective barrier, but when it's broken, germs can get inside and cause an infection. The area around the bite may become red and swollen.  The bite might hurt more than it initially did, and it could feel tender to the touch. The skin around the bite may feel warm. You might see pus or other fluid coming from the wound. You could develop a fever as your body fights the infection.  It's important to clean the wound thoroughly and seek medical attention if you notice any signs of infection. Your doctor HAS prescribeD antibiotics to help clear up the infection and prevent it from spreading.  PLAN:  Antibiotics as follows:   -Bactrim DS (800 mg-160 mg):Take one tablet by mouth twice a day.  -Amoxicillin-Clavulanate (875 mg-125 mg): Take one tablet by mouth twice a day.  Wound Care Instructions:  1. Betadine Soaks: Soak the wound in a 10% Betadine solution 2-3 times a day.       2. Expression:During the Betadine soaks, gently express the wound to help with drainage.  3. Pain Management: Consider taking an NSAID (e.g., ibuprofen) 30 minutes before expressing the wound to help manage pain.  4. Dressing: After each soak and expression, apply a dry sterile dressing  to the wound.      SECONDARY DISCHARGE DIAGNOSES  Diagnosis: Hyperglycemia  Assessment and Plan of Treatment: Uncontrolled diabetes means that the sugar levels in your blood are not being managed well. When you have diabetes, your body has trouble using sugar from the food you eat as energy. If your blood sugar stays too high for too long, it can cause problems like feeling tired, thirsty, or needing to urinate often. Over time, it can also lead to more serious issues with your eyes, kidneys, nerves, and heart. Managing diabetes usually involves a combination of healthy eating, regular exercise, checking your blood sugar levels, and taking medications if prescribed. It's important to keep your diabetes under control to stay healthy and prevent complications.  PLAN  Please follow your new diabetes management plan:  Metformin 1000mg TWICE A DAY   Januvia 100mg daily  Glimepiride 2mg before breakfast   In regards to your diabetes management, the you can follow up at discharge with [facility] by calling [phone] to make an appointment.

## 2025-02-13 NOTE — DISCHARGE NOTE NURSING/CASE MANAGEMENT/SOCIAL WORK - PATIENT PORTAL LINK FT
You can access the FollowMyHealth Patient Portal offered by [facility] by registering at the following website: [email or web address]. By joining [facility]’s FollowMyHealth portal, you will also be able to view your health information using other applications (apps) compatible with our system.

## 2025-02-13 NOTE — PROGRESS NOTE ADULT - PROBLEM SELECTOR PLAN 2
Uncontrolled DM, no insulin therapy. A1c of 10 on Januvia 100 mg daily and Metformin 1000 mg BID. Last A1c of 10.1 3 months prior. Patient denies paresthesias though noted to be on gabapentin.   2/12 Hgb A1C 13.9     -endo following; reccs below  -increase Lispro to 7units  -NPH 15 units  - Sliding scale and accuchecks.

## 2025-02-14 LAB
CULTURE RESULTS: ABNORMAL
GRAM STN FLD: ABNORMAL
SPECIMEN SOURCE: SIGNIFICANT CHANGE UP

## 2025-02-18 RX ORDER — ATORVASTATIN CALCIUM 80 MG/1
1 TABLET, FILM COATED ORAL
Refills: 0 | DISCHARGE

## 2025-02-18 RX ORDER — ALBUTEROL 90 MCG
2 AEROSOL REFILL (GRAM) INHALATION
Refills: 0 | DISCHARGE

## 2025-02-18 RX ORDER — LEVOTHYROXINE SODIUM 25 UG/1
1 TABLET ORAL
Refills: 0 | DISCHARGE

## 2025-02-18 RX ORDER — ESCITALOPRAM 10 MG/1
1 TABLET, FILM COATED ORAL
Refills: 0 | DISCHARGE

## 2025-02-18 RX ORDER — CLONAZEPAM 2 MG
1 TABLET ORAL
Refills: 0 | DISCHARGE

## 2025-02-18 RX ORDER — GABAPENTIN 800 MG/1
0 TABLET ORAL
Refills: 0 | DISCHARGE

## 2025-02-18 NOTE — CHART NOTE - NSCHARTNOTEFT_GEN_A_CORE
Post-Discharge Medication Review: Completed	  	  Patient's preferred pharmacy was updated in OMR: [facility] [id number]	  	  Patient contacted to offer medication counseling post-discharge. Medication reconciliation completed.     Per patient, medications include:	  1.	amoxicillin-clavulanate 875 mg-125 mg oral tablet 875 milligram(s) orally 2 times a day  2.	Bactrim  mg-160 mg oral tablet 1 tab(s) orally 2 times a day  3.	glimepiride 2 mg oral tablet 1 tab(s) orally once a day Please take before breakfast  4.	Januvia 100 mg oral tablet 1 tab(s) orally once a day  5.	Lexapro 10 mg oral tablet 1 tab(s) orally once a day  6.	Lipitor 40 mg oral tablet 1 tab(s) orally once a day  7.	MetFORMIN (Eqv-Fortamet) 1000 mg oral tablet, extended release 1 tab(s) orally 2 times a day  8.	Synthroid 150 mcg (0.15 mg) oral tablet 1 tab(s) orally once a day   	  Medications added to OMR (updated per discussion with patient):	  9.	KlonoPIN 1 mg oral tablet 1 tab(s) orally 3 times a day as needed for anxiety  10.	Albuterol (Eqv-ProAir HFA) 90 mcg/inh inhalation aerosol 2 puff(s) inhaled every 6 hours as needed for shortness of breath and/or wheezing  	  Medications removed from OMR (updated per discussion with patient):	  1.	gabapentin 300 mg oral tablet orally once a day (at bedtime)  	  Medication name, indication, administration, side effect, and monitoring reviewed for new medications during post discharge counseling visit with patient. Patient demonstrated understanding. Counseling offered for all medications.	  	  Shayan Rodriguez, Melinda	  Clinical Pharmacy Specialist, Pharmacy Telehealth Team	  Can be reached via [employer] Teams or [phone]

## 2025-02-26 DIAGNOSIS — Z79.890 HORMONE REPLACEMENT THERAPY: ICD-10-CM

## 2025-02-26 DIAGNOSIS — L02.511 CUTANEOUS ABSCESS OF RIGHT HAND: ICD-10-CM

## 2025-02-26 DIAGNOSIS — L03.011 CELLULITIS OF RIGHT FINGER: ICD-10-CM
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
## 2025-02-26 DIAGNOSIS — E03.9 HYPOTHYROIDISM, UNSPECIFIED: ICD-10-CM

## 2025-02-26 DIAGNOSIS — E78.5 HYPERLIPIDEMIA, UNSPECIFIED: ICD-10-CM

## 2025-02-26 DIAGNOSIS — E11.65 TYPE 2 DIABETES MELLITUS WITH HYPERGLYCEMIA: ICD-10-CM

## 2025-02-26 DIAGNOSIS — I10 ESSENTIAL (PRIMARY) HYPERTENSION: ICD-10-CM

## 2025-02-26 DIAGNOSIS — L30.9 DERMATITIS, UNSPECIFIED: ICD-10-CM

## 2025-02-26 DIAGNOSIS — Z79.84 LONG TERM (CURRENT) USE OF ORAL HYPOGLYCEMIC DRUGS: ICD-10-CM
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
## 2025-02-26 DIAGNOSIS — L03.90 CELLULITIS, UNSPECIFIED: ICD-10-CM

## 2025-02-26 DIAGNOSIS — N92.0 EXCESSIVE AND FREQUENT MENSTRUATION WITH REGULAR CYCLE: ICD-10-CM

## 2025-02-26 DIAGNOSIS — F32.9 MAJOR DEPRESSIVE DISORDER, SINGLE EPISODE, UNSPECIFIED: ICD-10-CM

## 2025-02-26 DIAGNOSIS — S61.250A OPEN BITE OF RIGHT INDEX FINGER WITHOUT DAMAGE TO NAIL, INITIAL ENCOUNTER: ICD-10-CM

## 2025-02-26 DIAGNOSIS — E11.319 TYPE 2 DIABETES MELLITUS WITH UNSPECIFIED DIABETIC RETINOPATHY WITHOUT MACULAR EDEMA: ICD-10-CM

## 2025-02-26 DIAGNOSIS — D50.9 IRON DEFICIENCY ANEMIA, UNSPECIFIED: ICD-10-CM

## 2025-05-15 ENCOUNTER — APPOINTMENT (OUTPATIENT)
Dept: NEUROLOGY | Facility: CLINIC | Age: 45
End: 2025-05-15

## 2025-05-22 ENCOUNTER — APPOINTMENT (OUTPATIENT)
Dept: DERMATOLOGY | Facility: CLINIC | Age: 45
End: 2025-05-22
Payer: MEDICAID

## 2025-05-22 DIAGNOSIS — L28.1 PRURIGO NODULARIS: ICD-10-CM

## 2025-05-22 DIAGNOSIS — L20.9 ATOPIC DERMATITIS, UNSPECIFIED: ICD-10-CM

## 2025-05-22 PROCEDURE — 99204 OFFICE O/P NEW MOD 45 MIN: CPT

## 2025-05-22 RX ORDER — NEMOLIZUMAB-ILTO 30 MG/100MG
30 INJECTION, POWDER, LYOPHILIZED, FOR SOLUTION SUBCUTANEOUS
Qty: 1 | Refills: 5 | Status: ACTIVE | COMMUNITY
Start: 2025-05-22 | End: 1900-01-01

## 2025-05-22 RX ORDER — TRIAMCINOLONE ACETONIDE 1 MG/G
0.1 CREAM TOPICAL
Qty: 454 | Refills: 0 | Status: ACTIVE | COMMUNITY
Start: 2025-05-22 | End: 1900-01-01

## 2025-05-23 ENCOUNTER — NON-APPOINTMENT (OUTPATIENT)
Age: 45
End: 2025-05-23

## 2025-05-23 RX ORDER — NEMOLIZUMAB-ILTO 30 MG/100MG
30 INJECTION, POWDER, LYOPHILIZED, FOR SOLUTION SUBCUTANEOUS
Qty: 2 | Refills: 0 | Status: ACTIVE | COMMUNITY
Start: 2025-05-22

## 2025-07-24 ENCOUNTER — APPOINTMENT (OUTPATIENT)
Dept: DERMATOLOGY | Facility: CLINIC | Age: 45
End: 2025-07-24